# Patient Record
Sex: MALE | Race: WHITE | NOT HISPANIC OR LATINO | Employment: OTHER | URBAN - METROPOLITAN AREA
[De-identification: names, ages, dates, MRNs, and addresses within clinical notes are randomized per-mention and may not be internally consistent; named-entity substitution may affect disease eponyms.]

---

## 2021-05-31 ENCOUNTER — APPOINTMENT (EMERGENCY)
Dept: RADIOLOGY | Facility: HOSPITAL | Age: 71
DRG: 494 | End: 2021-05-31
Payer: MEDICARE

## 2021-05-31 ENCOUNTER — HOSPITAL ENCOUNTER (INPATIENT)
Facility: HOSPITAL | Age: 71
LOS: 3 days | Discharge: HOME HEALTH | DRG: 494 | End: 2021-06-03
Attending: ORTHOPAEDIC SURGERY | Admitting: SURGERY
Payer: MEDICARE

## 2021-05-31 ENCOUNTER — ANESTHESIA EVENT (INPATIENT)
Dept: PERIOP | Facility: HOSPITAL | Age: 71
DRG: 494 | End: 2021-05-31
Payer: MEDICARE

## 2021-05-31 ENCOUNTER — APPOINTMENT (INPATIENT)
Dept: RADIOLOGY | Facility: HOSPITAL | Age: 71
DRG: 494 | End: 2021-05-31
Payer: MEDICARE

## 2021-05-31 DIAGNOSIS — S82.251A CLOSED DISPLACED COMMINUTED FRACTURE OF SHAFT OF RIGHT TIBIA, INITIAL ENCOUNTER: ICD-10-CM

## 2021-05-31 DIAGNOSIS — S82.891A CLOSED FRACTURE OF RIGHT ANKLE, INITIAL ENCOUNTER: Primary | ICD-10-CM

## 2021-05-31 DIAGNOSIS — S92.001A CLOSED DISPLACED FRACTURE OF RIGHT CALCANEUS, UNSPECIFIED PORTION OF CALCANEUS, INITIAL ENCOUNTER: ICD-10-CM

## 2021-05-31 PROBLEM — K21.9 GASTROESOPHAGEAL REFLUX DISEASE: Status: ACTIVE | Noted: 2021-05-31

## 2021-05-31 LAB
ANION GAP SERPL CALCULATED.3IONS-SCNC: 7 MMOL/L (ref 4–13)
APTT PPP: 24 SECONDS (ref 23–37)
ATRIAL RATE: 60 BPM
BASE EXCESS BLDA CALC-SCNC: 0 MMOL/L (ref -2–3)
BUN SERPL-MCNC: 20 MG/DL (ref 5–25)
CALCIUM SERPL-MCNC: 9.1 MG/DL (ref 8.3–10.1)
CHLORIDE SERPL-SCNC: 109 MMOL/L (ref 100–108)
CO2 SERPL-SCNC: 23 MMOL/L (ref 21–32)
CREAT SERPL-MCNC: 0.94 MG/DL (ref 0.6–1.3)
ERYTHROCYTE [DISTWIDTH] IN BLOOD BY AUTOMATED COUNT: 13.2 % (ref 11.6–15.1)
GFR SERPL CREATININE-BSD FRML MDRD: 82 ML/MIN/1.73SQ M
GLUCOSE SERPL-MCNC: 149 MG/DL (ref 65–140)
GLUCOSE SERPL-MCNC: 152 MG/DL (ref 65–140)
HCO3 BLDA-SCNC: 22.9 MMOL/L (ref 24–30)
HCT VFR BLD AUTO: 44.8 % (ref 36.5–49.3)
HCT VFR BLD CALC: 45 % (ref 36.5–49.3)
HGB BLD-MCNC: 15.1 G/DL (ref 12–17)
HGB BLDA-MCNC: 15.3 G/DL (ref 12–17)
HOLD SPECIMEN: NORMAL
HOLD SPECIMEN: YES
INR PPP: 0.98 (ref 0.84–1.19)
MCH RBC QN AUTO: 31.1 PG (ref 26.8–34.3)
MCHC RBC AUTO-ENTMCNC: 33.7 G/DL (ref 31.4–37.4)
MCV RBC AUTO: 92 FL (ref 82–98)
P AXIS: 78 DEGREES
PCO2 BLD: 24 MMOL/L (ref 21–32)
PCO2 BLD: 33.3 MM HG (ref 42–50)
PH BLD: 7.45 [PH] (ref 7.3–7.4)
PLATELET # BLD AUTO: 237 THOUSANDS/UL (ref 149–390)
PMV BLD AUTO: 9.6 FL (ref 8.9–12.7)
PO2 BLD: 31 MM HG (ref 35–45)
POTASSIUM BLD-SCNC: 3.9 MMOL/L (ref 3.5–5.3)
POTASSIUM SERPL-SCNC: 3.9 MMOL/L (ref 3.5–5.3)
PR INTERVAL: 188 MS
PROTHROMBIN TIME: 13 SECONDS (ref 11.6–14.5)
QRS AXIS: 57 DEGREES
QRSD INTERVAL: 100 MS
QT INTERVAL: 410 MS
QTC INTERVAL: 410 MS
RBC # BLD AUTO: 4.85 MILLION/UL (ref 3.88–5.62)
SAO2 % BLD FROM PO2: 64 % (ref 60–85)
SODIUM BLD-SCNC: 140 MMOL/L (ref 136–145)
SODIUM SERPL-SCNC: 139 MMOL/L (ref 136–145)
SPECIMEN SOURCE: ABNORMAL
T WAVE AXIS: 30 DEGREES
VENTRICULAR RATE: 60 BPM
WBC # BLD AUTO: 7.29 THOUSAND/UL (ref 4.31–10.16)

## 2021-05-31 PROCEDURE — 82803 BLOOD GASES ANY COMBINATION: CPT

## 2021-05-31 PROCEDURE — 82947 ASSAY GLUCOSE BLOOD QUANT: CPT

## 2021-05-31 PROCEDURE — 80048 BASIC METABOLIC PNL TOTAL CA: CPT | Performed by: ORTHOPAEDIC SURGERY

## 2021-05-31 PROCEDURE — 85730 THROMBOPLASTIN TIME PARTIAL: CPT | Performed by: ORTHOPAEDIC SURGERY

## 2021-05-31 PROCEDURE — 73590 X-RAY EXAM OF LOWER LEG: CPT

## 2021-05-31 PROCEDURE — 99285 EMERGENCY DEPT VISIT HI MDM: CPT

## 2021-05-31 PROCEDURE — 73030 X-RAY EXAM OF SHOULDER: CPT

## 2021-05-31 PROCEDURE — 99221 1ST HOSP IP/OBS SF/LOW 40: CPT | Performed by: SURGERY

## 2021-05-31 PROCEDURE — 85610 PROTHROMBIN TIME: CPT | Performed by: ORTHOPAEDIC SURGERY

## 2021-05-31 PROCEDURE — 93010 ELECTROCARDIOGRAM REPORT: CPT | Performed by: INTERNAL MEDICINE

## 2021-05-31 PROCEDURE — 76705 ECHO EXAM OF ABDOMEN: CPT | Performed by: SURGERY

## 2021-05-31 PROCEDURE — 73080 X-RAY EXAM OF ELBOW: CPT

## 2021-05-31 PROCEDURE — 84132 ASSAY OF SERUM POTASSIUM: CPT

## 2021-05-31 PROCEDURE — 85027 COMPLETE CBC AUTOMATED: CPT | Performed by: ORTHOPAEDIC SURGERY

## 2021-05-31 PROCEDURE — 85014 HEMATOCRIT: CPT

## 2021-05-31 PROCEDURE — 74177 CT ABD & PELVIS W/CONTRAST: CPT

## 2021-05-31 PROCEDURE — 93005 ELECTROCARDIOGRAM TRACING: CPT

## 2021-05-31 PROCEDURE — 73700 CT LOWER EXTREMITY W/O DYE: CPT

## 2021-05-31 PROCEDURE — 93308 TTE F-UP OR LMTD: CPT | Performed by: SURGERY

## 2021-05-31 PROCEDURE — 70450 CT HEAD/BRAIN W/O DYE: CPT

## 2021-05-31 PROCEDURE — 72125 CT NECK SPINE W/O DYE: CPT

## 2021-05-31 PROCEDURE — 84295 ASSAY OF SERUM SODIUM: CPT

## 2021-05-31 PROCEDURE — 36415 COLL VENOUS BLD VENIPUNCTURE: CPT | Performed by: ORTHOPAEDIC SURGERY

## 2021-05-31 PROCEDURE — 90715 TDAP VACCINE 7 YRS/> IM: CPT | Performed by: NURSE PRACTITIONER

## 2021-05-31 PROCEDURE — 96374 THER/PROPH/DIAG INJ IV PUSH: CPT

## 2021-05-31 PROCEDURE — 73560 X-RAY EXAM OF KNEE 1 OR 2: CPT

## 2021-05-31 PROCEDURE — NC001 PR NO CHARGE: Performed by: EMERGENCY MEDICINE

## 2021-05-31 PROCEDURE — 73650 X-RAY EXAM OF HEEL: CPT

## 2021-05-31 PROCEDURE — 71260 CT THORAX DX C+: CPT

## 2021-05-31 RX ORDER — FENTANYL CITRATE 50 UG/ML
1 INJECTION, SOLUTION INTRAMUSCULAR; INTRAVENOUS ONCE
Status: COMPLETED | OUTPATIENT
Start: 2021-05-31 | End: 2021-05-31

## 2021-05-31 RX ORDER — OMEPRAZOLE 20 MG/1
20 CAPSULE, DELAYED RELEASE ORAL DAILY
COMMUNITY

## 2021-05-31 RX ORDER — HYDROMORPHONE HYDROCHLORIDE 2 MG/1
4 TABLET ORAL EVERY 4 HOURS PRN
Status: DISCONTINUED | OUTPATIENT
Start: 2021-05-31 | End: 2021-06-03 | Stop reason: HOSPADM

## 2021-05-31 RX ORDER — ACETAMINOPHEN 325 MG/1
975 TABLET ORAL EVERY 8 HOURS SCHEDULED
Status: DISCONTINUED | OUTPATIENT
Start: 2021-05-31 | End: 2021-06-03 | Stop reason: HOSPADM

## 2021-05-31 RX ORDER — FENTANYL CITRATE 50 UG/ML
50 INJECTION, SOLUTION INTRAMUSCULAR; INTRAVENOUS ONCE
Status: DISCONTINUED | OUTPATIENT
Start: 2021-05-31 | End: 2021-06-03 | Stop reason: HOSPADM

## 2021-05-31 RX ORDER — HYDROMORPHONE HCL/PF 1 MG/ML
0.5 SYRINGE (ML) INJECTION
Status: DISCONTINUED | OUTPATIENT
Start: 2021-05-31 | End: 2021-06-02

## 2021-05-31 RX ORDER — SENNOSIDES 8.6 MG
2 TABLET ORAL DAILY
Status: DISCONTINUED | OUTPATIENT
Start: 2021-05-31 | End: 2021-06-03 | Stop reason: HOSPADM

## 2021-05-31 RX ORDER — FENTANYL CITRATE 50 UG/ML
INJECTION, SOLUTION INTRAMUSCULAR; INTRAVENOUS CODE/TRAUMA/SEDATION MEDICATION
Status: COMPLETED | OUTPATIENT
Start: 2021-05-31 | End: 2021-05-31

## 2021-05-31 RX ORDER — METHOCARBAMOL 750 MG/1
750 TABLET, FILM COATED ORAL EVERY 6 HOURS SCHEDULED
Status: DISCONTINUED | OUTPATIENT
Start: 2021-05-31 | End: 2021-06-03 | Stop reason: HOSPADM

## 2021-05-31 RX ORDER — SODIUM CHLORIDE, SODIUM GLUCONATE, SODIUM ACETATE, POTASSIUM CHLORIDE, MAGNESIUM CHLORIDE, SODIUM PHOSPHATE, DIBASIC, AND POTASSIUM PHOSPHATE .53; .5; .37; .037; .03; .012; .00082 G/100ML; G/100ML; G/100ML; G/100ML; G/100ML; G/100ML; G/100ML
75 INJECTION, SOLUTION INTRAVENOUS CONTINUOUS
Status: DISCONTINUED | OUTPATIENT
Start: 2021-05-31 | End: 2021-06-01

## 2021-05-31 RX ORDER — HYDROMORPHONE HYDROCHLORIDE 2 MG/1
2 TABLET ORAL EVERY 4 HOURS PRN
Status: DISCONTINUED | OUTPATIENT
Start: 2021-05-31 | End: 2021-06-03 | Stop reason: HOSPADM

## 2021-05-31 RX ORDER — PANTOPRAZOLE SODIUM 40 MG/1
40 TABLET, DELAYED RELEASE ORAL
Status: DISCONTINUED | OUTPATIENT
Start: 2021-06-01 | End: 2021-06-03 | Stop reason: HOSPADM

## 2021-05-31 RX ORDER — SODIUM CHLORIDE, SODIUM LACTATE, POTASSIUM CHLORIDE, CALCIUM CHLORIDE 600; 310; 30; 20 MG/100ML; MG/100ML; MG/100ML; MG/100ML
75 INJECTION, SOLUTION INTRAVENOUS CONTINUOUS
Status: DISCONTINUED | OUTPATIENT
Start: 2021-06-01 | End: 2021-06-01

## 2021-05-31 RX ORDER — DOCUSATE SODIUM 100 MG/1
100 CAPSULE, LIQUID FILLED ORAL 2 TIMES DAILY
Status: DISCONTINUED | OUTPATIENT
Start: 2021-05-31 | End: 2021-06-03 | Stop reason: HOSPADM

## 2021-05-31 RX ADMIN — TETANUS TOXOID, REDUCED DIPHTHERIA TOXOID AND ACELLULAR PERTUSSIS VACCINE, ADSORBED 0.5 ML: 5; 2.5; 8; 8; 2.5 SUSPENSION INTRAMUSCULAR at 11:32

## 2021-05-31 RX ADMIN — METHOCARBAMOL TABLETS 750 MG: 750 TABLET, COATED ORAL at 11:33

## 2021-05-31 RX ADMIN — HYDROMORPHONE HYDROCHLORIDE 2 MG: 2 TABLET ORAL at 14:18

## 2021-05-31 RX ADMIN — SODIUM CHLORIDE, SODIUM GLUCONATE, SODIUM ACETATE, POTASSIUM CHLORIDE, MAGNESIUM CHLORIDE, SODIUM PHOSPHATE, DIBASIC, AND POTASSIUM PHOSPHATE 75 ML/HR: .53; .5; .37; .037; .03; .012; .00082 INJECTION, SOLUTION INTRAVENOUS at 11:38

## 2021-05-31 RX ADMIN — ACETAMINOPHEN 975 MG: 325 TABLET, FILM COATED ORAL at 14:18

## 2021-05-31 RX ADMIN — HYDROMORPHONE HYDROCHLORIDE 0.5 MG: 1 INJECTION, SOLUTION INTRAMUSCULAR; INTRAVENOUS; SUBCUTANEOUS at 21:11

## 2021-05-31 RX ADMIN — HYDROMORPHONE HYDROCHLORIDE 0.5 MG: 1 INJECTION, SOLUTION INTRAMUSCULAR; INTRAVENOUS; SUBCUTANEOUS at 16:10

## 2021-05-31 RX ADMIN — ACETAMINOPHEN 975 MG: 325 TABLET, FILM COATED ORAL at 21:10

## 2021-05-31 RX ADMIN — HYDROMORPHONE HYDROCHLORIDE 4 MG: 2 TABLET ORAL at 19:56

## 2021-05-31 RX ADMIN — IOHEXOL 100 ML: 350 INJECTION, SOLUTION INTRAVENOUS at 10:20

## 2021-05-31 RX ADMIN — SODIUM CHLORIDE, SODIUM LACTATE, POTASSIUM CHLORIDE, AND CALCIUM CHLORIDE 75 ML/HR: .6; .31; .03; .02 INJECTION, SOLUTION INTRAVENOUS at 23:06

## 2021-05-31 RX ADMIN — HYDROMORPHONE HYDROCHLORIDE 0.5 MG: 1 INJECTION, SOLUTION INTRAMUSCULAR; INTRAVENOUS; SUBCUTANEOUS at 11:33

## 2021-05-31 RX ADMIN — FENTANYL CITRATE 50 MCG: 50 INJECTION INTRAMUSCULAR; INTRAVENOUS at 10:46

## 2021-05-31 NOTE — PROCEDURES
POC FAST US    Date/Time: 5/31/2021 11:50 AM  Performed by: JAMES Bush  Authorized by:  JAMES Bush     Patient location:  Trauma  Procedure details:     Exam Type:  Diagnostic    Indications: blunt abdominal trauma      Assess for:  Hemothorax, intra-abdominal fluid, pericardial effusion and pneumothorax    Technique: FAST      Views obtained:  Heart - Pericardial sac, RUQ - Nunez's Pouch, LUQ - Splenorenal space and Suprapubic - Pouch of Ivan    Image quality: diagnostic      Image availability:  Images available in PACS  FAST Findings:     RUQ (Hepatorenal) free fluid: absent      LUQ (Splenorenal) free fluid: absent      Suprapubic free fluid: absent      Cardiac wall motion: identified      Pericardial effusion: absent    Interpretation:     Impressions: negative

## 2021-05-31 NOTE — ANESTHESIA PREPROCEDURE EVALUATION
Procedure:  OPEN REDUCTION W/ INTERNAL FIXATION (ORIF) TIBIA (Right Leg Lower)    Relevant Problems   ANESTHESIA (within normal limits)   (-) History of anesthesia complications      CARDIO (within normal limits)      ENDO (within normal limits)      GI/HEPATIC   (+) Gastroesophageal reflux disease      /RENAL (within normal limits)      HEMATOLOGY (within normal limits)      NEURO/PSYCH (within normal limits)      PULMONARY (within normal limits)        Physical Exam    Airway    Mallampati score: II  TM Distance: >3 FB  Neck ROM: full     Dental   No notable dental hx     Cardiovascular  Rhythm: regular, Rate: normal, Cardiovascular exam normal    Pulmonary  Pulmonary exam normal Breath sounds clear to auscultation,     Other Findings        Anesthesia Plan  ASA Score- 2     Anesthesia Type- general with ASA Monitors  Additional Monitors:   Airway Plan: LMA  Plan Factors-Exercise tolerance (METS): >4 METS  Chart reviewed  Imaging results reviewed  Existing labs reviewed  Patient summary reviewed  Patient is not a current smoker  Patient did not smoke on day of surgery  Induction- intravenous  Postoperative Plan-   Planned trial extubation    Informed Consent- Anesthetic plan and risks discussed with patient  I personally reviewed this patient with the CRNA  Discussed and agreed on the Anesthesia Plan with the CRNA         NPO verified  NKDA  Plan:  GA    Risks and benefits of general anesthesia were discussed with the patient  Discussed risks of anesthesia including, but not limited to, the risk of dental injury, n/v, sore throat, corneal abrasions, and arrhythmias  Less common risks including MI, stroke, and death were also discussed  All questions were answered  Anesthesia consent was obtained from the patient

## 2021-05-31 NOTE — INCIDENTAL FINDINGS
The following findings require follow up:  Radiographic   Finding: Small pulmonary nodules as indicated above  Based on current Fleischner Society 2017 Guidelines on incidental pulmonary nodule, no routine follow-up is needed if the patient is considered low risk for lung cancer  If the patient is considered high   risk for lung cancer, 12 month follow-up non-contrast chest CT is recommended     Follow up required: PCP   Follow up should be done within 2 week(s)    Please notify the following clinician to assist with the follow up:   PCP, if you do not have one, would recommend [address] [place] [address]

## 2021-05-31 NOTE — PROGRESS NOTES
met Penny Mendez' wife Igor Mclain in the ED and put her in family waiting room  She and patient were calm and reported no needs at time of visit

## 2021-05-31 NOTE — H&P
H&P Exam - Trauma   Keyana Robert 80 y o  male MRN: [id number]  Unit/Bed#: TR 02 Encounter: [id number]    Assessment/Plan   Trauma Alert: Level B  Model of Arrival: Ambulance  Trauma Team: Attending Anders Brandt, Residents Tereza and ALVIN Lewis  Consultants: Orthopedics 10:15 am    Trauma Active Problems: S/P Fall from approximately 10 feet off a roof  Right ankle pain  Multiple abrasions  Right calcaneus fracture    Trauma Plan: Admit to trauma  Ortho consulted  Pain control  NPO for OR  Assist   Ortho with splinting      Chief Complaint: right ankle pain    History of Present Illness   HPI:  Keyana Robert is a 80 y o  male who presents after a fall from 10 feet off a roof, No LOC but did strike head  Abrasions on rigth elbow, x-rays negative  Recalls the fall, pain in right lower extremity only  No complaints of chest pain or shorttness of breath  No c-spine, T-0spine, or L-spine tenderness  Relatively healthy individual     Mechanism:Fall    Review of Systems   Constitutional: Negative  HENT: Negative  Eyes: Negative  Respiratory: Negative  Cardiovascular: Negative  Gastrointestinal: Negative  Endocrine: Negative  Genitourinary: Negative  Musculoskeletal:        Right ankle pain   Skin: Positive for wound  Multiple abrasions   Allergic/Immunologic: Negative  Neurological: Negative  Hematological: Negative  Psychiatric/Behavioral: Negative  12-point, complete review of systems was reviewed and negative except as stated above  Historical Information   History is obtainable from the patient   Efforts to obtain history included the following sources: EMS    No past medical history on file  No past surgical history on file    Social History   Social History     Substance and Sexual Activity   Alcohol Use Not on file     Social History     Substance and Sexual Activity   Drug Use Not on file     Social History     Tobacco Use   Smoking Status Not on file E-Cigarette/Vaping    E-Cigarette Use Never User      E-Cigarette/Vaping Substances    Nicotine No     THC No     CBD No     Flavoring No     Other No     Unknown No        There is no immunization history on file for this patient  Last Tetanus: today  Family History: Non-contributory        Meds/Allergies   all current active meds have been reviewed    No Known Allergies      PHYSICAL EXAM      Objective   Vitals:   First set: Temperature: 98 1 °F (36 7 °C) (05/31/21 1003)  Pulse: 69 (05/31/21 1003)  Respirations: 20 (05/31/21 1003)  Blood Pressure: 166/89 (05/31/21 1003)    Primary Survey:   (A) Airway: intact  (B) Breathing: non-labored  (C) Circulation: Pulses:  normal  (D) Disabliity:  GCS Total:  15, Eye Opening:   Spontaneous = 4, Motor Response: Obeys commands = 6 and Verbal Response:  Oriented = 5  (E) Expose:  Completed    Secondary Survey: (Click on Physical Exam tab above)  Physical Exam  Constitutional:       General: He is not in acute distress  Appearance: Normal appearance  HENT:      Head: Normocephalic and atraumatic  Right Ear: Tympanic membrane normal       Left Ear: Tympanic membrane normal       Nose: Nose normal       Mouth/Throat:      Pharynx: Oropharynx is clear  Eyes:      Extraocular Movements: Extraocular movements intact  Conjunctiva/sclera: Conjunctivae normal       Pupils: Pupils are equal, round, and reactive to light  Neck:      Musculoskeletal: Normal range of motion and neck supple  Cardiovascular:      Rate and Rhythm: Normal rate and regular rhythm  Pulses: Normal pulses  Heart sounds: Normal heart sounds  Pulmonary:      Effort: Pulmonary effort is normal       Breath sounds: Normal breath sounds  Abdominal:      General: Abdomen is flat  Bowel sounds are normal  There is no distension  Palpations: Abdomen is soft  Tenderness: There is no abdominal tenderness     Genitourinary:     Comments: Perineum clear  Musculoskeletal:         General: Tenderness and signs of injury present  Skin:     General: Skin is warm and dry  Capillary Refill: Capillary refill takes less than 2 seconds  Neurological:      General: No focal deficit present  Mental Status: He is alert and oriented to person, place, and time  Motor: No weakness  Psychiatric:         Mood and Affect: Mood normal          Behavior: Behavior normal          Thought Content: Thought content normal          Judgment: Judgment normal          Invasive Devices     Peripheral Intravenous Line            Peripheral IV 05/31/21 Right Antecubital less than 1 day    Peripheral IV 05/31/21 Right Hand less than 1 day                Lab Results: ISTAT and labs on hold  Imaging/EKG Studies: HCT - neg, CT C-spine - neg, c-spine cleared clinically and collar removed  FROM and no pain  CT C/A/P -   No acute posttraumatic abnormality identified in the chest, abdomen, or pelvis  Small pulmonary nodules as indicated above  Based on current Fleischner Society 2017 Guidelines on incidental pulmonary nodule, no routine follow-up is needed if the patient is considered low risk for lung cancer  If the patient is considered high   risk for lung cancer, 12 month follow-up non-contrast chest CT is recommended  Other Studies: CXR -pending  CT RLE -1  Comminuted mildly displaced distal right tibial fracture as noted with intra-articular extension  2   Mildly displaced comminuted calcaneal fracture as noted       Code Status: No Order  Advance Directive and Living Will:      Power of :    POLST:

## 2021-05-31 NOTE — CONSULTS
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 and a right hand boxer's fracture in the past, but has never had surgery to his legs  He does not smoke, drink or use any drugs  He is retired and lives at home  Review Of Systems:   · Skin: Normal  · Neuro: See HPI  · Musculoskeletal: See HPI  · 14 point review of systems negative except as stated above     Past Medical History:   No past medical history on file  Past Surgical History:   No past surgical history on file  Family History:  Family history reviewed and non-contributory  No family history on file      Social History:  Social History     Socioeconomic History    Marital status: /Civil Union     Spouse name: Not on file    Number of children: Not on file    Years of education: Not on file    Highest education level: Not on file   Occupational History    Not on file   Social Needs    Financial resource strain: Not on file    Food insecurity     Worry: Not on file     Inability: Not on file   [employer] needs     Medical: Not on file     Non-medical: Not on file   Tobacco Use    Smoking status: Not on file   Substance and Sexual Activity    Alcohol use: Not on file    Drug use: Not on file    Sexual activity: Not on file   Lifestyle    Physical activity     Days per week: Not on file     Minutes per session: Not on file    Stress: Not on file   Relationships    Social connections     Talks on phone: Not on file     Gets together: Not on file     Attends Jew service: Not on file     Active member of club or organization: Not on file     Attends meetings of clubs or organizations: Not on file     Relationship status: Not on file    Intimate partner violence     Fear of current or ex partner: Not on file     Emotionally abused: Not on file     Physically abused: Not on file     Forced sexual activity: Not on file   Other Topics Concern    Not on file   Social History Narrative    Not on file       Allergies:   No Known Allergies        Labs:  0   Lab Value Date/Time    HCT 45 05/31/2021 1010    HGB 15 3 05/31/2021 1010       Meds:  No current facility-administered medications for this encounter  No current outpatient medications on file  Blood Culture:   No results found for: BLOODCX    Wound Culture:   No results found for: WOUNDCULT    Ins and Outs:  No intake/output data recorded  Physical Exam:   BP (!) 171/92   Pulse 62   Temp 98 1 °F (36 7 °C)   Resp 20   Wt 98 7 kg (217 lb 9 5 oz)   SpO2 98%   Gen: Alert and oriented to person, place, time  HEENT: EOMI, eyes clear, moist mucus membranes, hearing intact  Respiratory: Bilateral chest rise  No audible wheezing found  Cardiovascular: Regular Rate and Rhythm  Abdomen: soft nontender/nondistended  Musculoskeletal: right lower extremity  · Skin intact  · Tender to palpation over tibial shaft and over calcaneus  · Sensation intact dp/sp/tib/silvia/saph  · Intact ankle DF/PF, fhl/ehl  · Musculature of lower leg soft and compressible   · No pain with passive stretch  · Palpable DP pulse    Radiology:   I personally reviewed the films    X-rays right tib/fib shows a distal third tibial shaft fracture with proximal comminution  X-ray right calcaneus: comminuted displaced calcaneus fracture  CT right tibia/ankle: comminuted distal third tibia fracture with intra-articular extension and a comminuted calcaneus fracture     _*_*_*_*_*_*_*_*_*_*_*_*_*_*_*_*_*_*_*_*_*_*_*_*_*_*_*_*_*_*_*_*_*_*_*_*_*_*_*_*_*    Assessment:  80 y o  male status post fall from a roof with right tibial shaft fracture and calcaneus fracture  Placed in a bulky pulliam  long leg splint with stirrups  Plan:   · Non weight bearing right lower extremity in splint  · Analgesics for pain  · Informed consent obtained    · Pre op labs in ED  · NPO   · To OR for operative fixation of tibial shaft fracture when cleared  · Dispo: Ortho will follow      Linda Pendleton MD

## 2021-05-31 NOTE — ED PROVIDER NOTES
Emergency Department Airway Evaluation and Management Form    History  Obtained from:  EMS  Patient has no known allergies  Chief Complaint   Patient presents with    Fall     fall over 10 ft roof  no thinners, no LOC  head abrasion  RLE deformity      HPI patient is a 64-year-old male who was working on the roof when he fell  Patient landed on his feet, primarily his right foot, and fell backwards striking his head  No loss of consciousness  Patient complains of 3 to 4/10 right ankle and foot pain  Also has some aching in his right shoulder  No past medical history on file  No past surgical history on file  No family history on file  Social History     Tobacco Use    Smoking status: Not on file   Substance Use Topics    Alcohol use: Not on file    Drug use: Not on file     I have reviewed and agree with the history as documented      Review of Systems    Physical Exam  BP (!) 171/92   Pulse 62   Temp 98 1 °F (36 7 °C)   Resp 20   Wt 98 7 kg (217 lb 9 5 oz)   SpO2 98%     Physical Exam  No pooling secretions, lungs are clear and equal, no chest wall tenderness, adequate oxygen saturation with normal work of breathing, deformity and tenderness to right lower extremity  ED Medications  Medications   fentanyl citrate (PF) (FOR EMS ONLY) 100 mcg/2 mL injection 100 mcg (0 mcg Does not apply Given to EMS 5/31/21 1008)       Intubation  Procedures    Notes      Final Diagnosis  Final diagnoses:   None     Impression:  Tibial fracture, stable airway  ED Provider  Electronically Signed by     Jacob Culver MD  05/31/21 1013

## 2021-05-31 NOTE — PLAN OF CARE
Problem: PAIN - ADULT  Goal: Verbalizes/displays adequate comfort level or baseline comfort level  Description: Interventions:  - Encourage patient to monitor pain and request assistance  - Assess pain using appropriate pain scale  - Administer analgesics based on type and severity of pain and evaluate response  - Implement non-pharmacological measures as appropriate and evaluate response  - Consider cultural and social influences on pain and pain management  - Notify physician/advanced practitioner if interventions unsuccessful or patient reports new pain  Outcome: Progressing     Problem: INFECTION - ADULT  Goal: Absence or prevention of progression during hospitalization  Description: INTERVENTIONS:  - Assess and monitor for signs and symptoms of infection  - Monitor lab/diagnostic results  - Monitor all insertion sites, i e  indwelling lines, tubes, and drains  - Monitor endotracheal if appropriate and nasal secretions for changes in amount and color  - Palm appropriate cooling/warming therapies per order  - Administer medications as ordered  - Instruct and encourage patient and family to use good hand hygiene technique  - Identify and instruct in appropriate isolation precautions for identified infection/condition  Outcome: Progressing  Goal: Absence of fever/infection during neutropenic period  Description: INTERVENTIONS:  - Monitor WBC    Outcome: Progressing     Problem: SAFETY ADULT  Goal: Patient will remain free of falls  Description: INTERVENTIONS:  - Assess patient frequently for physical needs  -  Identify cognitive and physical deficits and behaviors that affect risk of falls    -  Palm fall precautions as indicated by assessment   - Educate patient/family on patient safety including physical limitations  - Instruct patient to call for assistance with activity based on assessment  - Modify environment to reduce risk of injury  - Consider OT/PT consult to assist with strengthening/mobility  Outcome: Progressing  Goal: Maintain or return to baseline ADL function  Description: INTERVENTIONS:  -  Assess patient's ability to carry out ADLs; assess patient's baseline for ADL function and identify physical deficits which impact ability to perform ADLs (bathing, care of mouth/teeth, toileting, grooming, dressing, etc )  - Assess/evaluate cause of self-care deficits   - Assess range of motion  - Assess patient's mobility; develop plan if impaired  - Assess patient's need for assistive devices and provide as appropriate  - Encourage maximum independence but intervene and supervise when necessary  - Involve family in performance of ADLs  - Assess for home care needs following discharge   - Consider OT consult to assist with ADL evaluation and planning for discharge  - Provide patient education as appropriate  Outcome: Progressing  Goal: Maintain or return mobility status to optimal level  Description: INTERVENTIONS:  - Assess patient's baseline mobility status (ambulation, transfers, stairs, etc )    - Identify cognitive and physical deficits and behaviors that affect mobility  - Identify mobility aids required to assist with transfers and/or ambulation (gait belt, sit-to-stand, lift, walker, cane, etc )  - [place] fall precautions as indicated by assessment  - Record patient progress and toleration of activity level on Mobility SBAR; progress patient to next Phase/Stage  - Instruct patient to call for assistance with activity based on assessment  - Consider rehabilitation consult to assist with strengthening/weightbearing, etc   Outcome: Progressing     Problem: DISCHARGE PLANNING  Goal: Discharge to home or other facility with appropriate resources  Description: INTERVENTIONS:  - Identify barriers to discharge w/patient and caregiver  - Arrange for needed discharge resources and transportation as appropriate  - Identify discharge learning needs (meds, wound care, etc )  - Arrange for interpretive services to assist at discharge as needed  - Refer to Case Management Department for coordinating discharge planning if the patient needs post-hospital services based on physician/advanced practitioner order or complex needs related to functional status, cognitive ability, or social support system  Outcome: Progressing     Problem: Knowledge Deficit  Goal: Patient/family/caregiver demonstrates understanding of disease process, treatment plan, medications, and discharge instructions  Description: Complete learning assessment and assess knowledge base    Interventions:  - Provide teaching at level of understanding  - Provide teaching via preferred learning methods  Outcome: Progressing

## 2021-06-01 ENCOUNTER — ANESTHESIA (INPATIENT)
Dept: PERIOP | Facility: HOSPITAL | Age: 71
DRG: 494 | End: 2021-06-01
Payer: MEDICARE

## 2021-06-01 ENCOUNTER — APPOINTMENT (INPATIENT)
Dept: RADIOLOGY | Facility: HOSPITAL | Age: 71
DRG: 494 | End: 2021-06-01
Payer: MEDICARE

## 2021-06-01 PROBLEM — S82.401A TIBIA/FIBULA FRACTURE, RIGHT, CLOSED, INITIAL ENCOUNTER: Status: ACTIVE | Noted: 2021-06-01

## 2021-06-01 PROBLEM — S92.009A CLOSED FRACTURE OF CALCANEUS: Status: ACTIVE | Noted: 2021-06-01
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
## 2021-06-01 PROBLEM — M25.511 ACUTE PAIN OF RIGHT SHOULDER: Status: ACTIVE | Noted: 2021-06-01

## 2021-06-01 PROBLEM — S82.201A TIBIA/FIBULA FRACTURE, RIGHT, CLOSED, INITIAL ENCOUNTER: Status: ACTIVE | Noted: 2021-06-01

## 2021-06-01 PROBLEM — G89.11 ACUTE PAIN DUE TO TRAUMA: Status: ACTIVE | Noted: 2021-06-01

## 2021-06-01 LAB
ABO GROUP BLD: NORMAL
ABO GROUP BLD: NORMAL
ANION GAP SERPL CALCULATED.3IONS-SCNC: 6 MMOL/L (ref 4–13)
BLD GP AB SCN SERPL QL: NEGATIVE
BUN SERPL-MCNC: 14 MG/DL (ref 5–25)
CALCIUM SERPL-MCNC: 8.4 MG/DL (ref 8.3–10.1)
CHLORIDE SERPL-SCNC: 107 MMOL/L (ref 100–108)
CO2 SERPL-SCNC: 26 MMOL/L (ref 21–32)
CREAT SERPL-MCNC: 0.83 MG/DL (ref 0.6–1.3)
ERYTHROCYTE [DISTWIDTH] IN BLOOD BY AUTOMATED COUNT: 13.4 % (ref 11.6–15.1)
GFR SERPL CREATININE-BSD FRML MDRD: 89 ML/MIN/1.73SQ M
GLUCOSE SERPL-MCNC: 132 MG/DL (ref 65–140)
HCT VFR BLD AUTO: 39.4 % (ref 36.5–49.3)
HGB BLD-MCNC: 13.3 G/DL (ref 12–17)
MCH RBC QN AUTO: 31.5 PG (ref 26.8–34.3)
MCHC RBC AUTO-ENTMCNC: 33.8 G/DL (ref 31.4–37.4)
MCV RBC AUTO: 93 FL (ref 82–98)
PLATELET # BLD AUTO: 165 THOUSANDS/UL (ref 149–390)
PMV BLD AUTO: 9.3 FL (ref 8.9–12.7)
POTASSIUM SERPL-SCNC: 3.9 MMOL/L (ref 3.5–5.3)
RBC # BLD AUTO: 4.22 MILLION/UL (ref 3.88–5.62)
RH BLD: POSITIVE
RH BLD: POSITIVE
SODIUM SERPL-SCNC: 139 MMOL/L (ref 136–145)
SPECIMEN EXPIRATION DATE: NORMAL
WBC # BLD AUTO: 6.63 THOUSAND/UL (ref 4.31–10.16)

## 2021-06-01 PROCEDURE — 99232 SBSQ HOSP IP/OBS MODERATE 35: CPT | Performed by: SURGERY

## 2021-06-01 PROCEDURE — C1713 ANCHOR/SCREW BN/BN,TIS/BN: HCPCS | Performed by: ORTHOPAEDIC SURGERY

## 2021-06-01 PROCEDURE — 80048 BASIC METABOLIC PNL TOTAL CA: CPT | Performed by: NURSE PRACTITIONER

## 2021-06-01 PROCEDURE — 27758 TREATMENT OF TIBIA FRACTURE: CPT | Performed by: ORTHOPAEDIC SURGERY

## 2021-06-01 PROCEDURE — 86901 BLOOD TYPING SEROLOGIC RH(D): CPT | Performed by: ORTHOPAEDIC SURGERY

## 2021-06-01 PROCEDURE — 86900 BLOOD TYPING SEROLOGIC ABO: CPT | Performed by: ORTHOPAEDIC SURGERY

## 2021-06-01 PROCEDURE — NC001 PR NO CHARGE: Performed by: ORTHOPAEDIC SURGERY

## 2021-06-01 PROCEDURE — 85027 COMPLETE CBC AUTOMATED: CPT | Performed by: NURSE PRACTITIONER

## 2021-06-01 PROCEDURE — 0QSG04Z REPOSITION RIGHT TIBIA WITH INTERNAL FIXATION DEVICE, OPEN APPROACH: ICD-10-PCS | Performed by: ORTHOPAEDIC SURGERY

## 2021-06-01 PROCEDURE — G9197 ORDER FOR CEPH: HCPCS | Performed by: ORTHOPAEDIC SURGERY

## 2021-06-01 PROCEDURE — 99223 1ST HOSP IP/OBS HIGH 75: CPT | Performed by: ORTHOPAEDIC SURGERY

## 2021-06-01 PROCEDURE — 73590 X-RAY EXAM OF LOWER LEG: CPT

## 2021-06-01 PROCEDURE — 86850 RBC ANTIBODY SCREEN: CPT | Performed by: ORTHOPAEDIC SURGERY

## 2021-06-01 DEVICE — 3.5MM CORTEX SCREW SELF-TAPPING 30MM: Type: IMPLANTABLE DEVICE | Status: FUNCTIONAL

## 2021-06-01 DEVICE — 3.5MM LOCKING SCREW SLF-TPNG W/STARDRIVE(TM) RECESS 45MM: Type: IMPLANTABLE DEVICE | Status: FUNCTIONAL

## 2021-06-01 DEVICE — 3.5MM LOCKING SCREW SLF-TPNG W/STARDRIVE(TM) RECESS 28MM: Type: IMPLANTABLE DEVICE | Status: FUNCTIONAL

## 2021-06-01 DEVICE — 3.5MM CORTEX SCREW SELF-TAPPING 28MM: Type: IMPLANTABLE DEVICE | Status: FUNCTIONAL

## 2021-06-01 DEVICE — 3.5MM CORTEX SCREW SELF-TAPPING 32MM: Type: IMPLANTABLE DEVICE | Status: FUNCTIONAL

## 2021-06-01 DEVICE — 3.5MM LCP LOW BEND MEDIAL DSTL TIBIA PLATE/14H/RIGHT/239MM
Type: IMPLANTABLE DEVICE | Site: TIBIA | Status: FUNCTIONAL
Brand: LCP

## 2021-06-01 DEVICE — 3.5MM LOCKING SCREW SLF-TPNG W/STARDRIVE(TM) RECESS 40MM: Type: IMPLANTABLE DEVICE | Status: FUNCTIONAL

## 2021-06-01 RX ORDER — KETAMINE HCL IN NACL, ISO-OSM 100MG/10ML
SYRINGE (ML) INJECTION AS NEEDED
Status: DISCONTINUED | OUTPATIENT
Start: 2021-06-01 | End: 2021-06-01

## 2021-06-01 RX ORDER — SODIUM CHLORIDE, SODIUM LACTATE, POTASSIUM CHLORIDE, CALCIUM CHLORIDE 600; 310; 30; 20 MG/100ML; MG/100ML; MG/100ML; MG/100ML
75 INJECTION, SOLUTION INTRAVENOUS CONTINUOUS
Status: DISCONTINUED | OUTPATIENT
Start: 2021-06-01 | End: 2021-06-01

## 2021-06-01 RX ORDER — CEFAZOLIN SODIUM 2 G/50ML
2000 SOLUTION INTRAVENOUS EVERY 8 HOURS
Status: COMPLETED | OUTPATIENT
Start: 2021-06-01 | End: 2021-06-02

## 2021-06-01 RX ORDER — PROPOFOL 10 MG/ML
INJECTION, EMULSION INTRAVENOUS AS NEEDED
Status: DISCONTINUED | OUTPATIENT
Start: 2021-06-01 | End: 2021-06-01

## 2021-06-01 RX ORDER — SODIUM CHLORIDE, SODIUM LACTATE, POTASSIUM CHLORIDE, CALCIUM CHLORIDE 600; 310; 30; 20 MG/100ML; MG/100ML; MG/100ML; MG/100ML
75 INJECTION, SOLUTION INTRAVENOUS CONTINUOUS
Status: DISCONTINUED | OUTPATIENT
Start: 2021-06-01 | End: 2021-06-02

## 2021-06-01 RX ORDER — DEXAMETHASONE SODIUM PHOSPHATE 10 MG/ML
INJECTION, SOLUTION INTRAMUSCULAR; INTRAVENOUS AS NEEDED
Status: DISCONTINUED | OUTPATIENT
Start: 2021-06-01 | End: 2021-06-01

## 2021-06-01 RX ORDER — HYDROMORPHONE HCL 110MG/55ML
PATIENT CONTROLLED ANALGESIA SYRINGE INTRAVENOUS AS NEEDED
Status: DISCONTINUED | OUTPATIENT
Start: 2021-06-01 | End: 2021-06-01

## 2021-06-01 RX ORDER — MAGNESIUM HYDROXIDE 1200 MG/15ML
LIQUID ORAL AS NEEDED
Status: DISCONTINUED | OUTPATIENT
Start: 2021-06-01 | End: 2021-06-01 | Stop reason: HOSPADM

## 2021-06-01 RX ORDER — CEFAZOLIN SODIUM 2 G/50ML
2000 SOLUTION INTRAVENOUS
Status: ACTIVE | OUTPATIENT
Start: 2021-06-01 | End: 2021-06-02

## 2021-06-01 RX ORDER — HYDROMORPHONE HCL/PF 1 MG/ML
0.25 SYRINGE (ML) INJECTION
Status: DISCONTINUED | OUTPATIENT
Start: 2021-06-01 | End: 2021-06-01 | Stop reason: HOSPADM

## 2021-06-01 RX ORDER — ONDANSETRON 2 MG/ML
4 INJECTION INTRAMUSCULAR; INTRAVENOUS ONCE AS NEEDED
Status: DISCONTINUED | OUTPATIENT
Start: 2021-06-01 | End: 2021-06-01 | Stop reason: HOSPADM

## 2021-06-01 RX ORDER — OXYCODONE HYDROCHLORIDE 5 MG/1
5 TABLET ORAL EVERY 6 HOURS PRN
Qty: 30 TABLET | Refills: 0 | Status: SHIPPED | OUTPATIENT
Start: 2021-06-01 | End: 2021-06-17 | Stop reason: SDUPTHER

## 2021-06-01 RX ORDER — LIDOCAINE HYDROCHLORIDE 10 MG/ML
INJECTION, SOLUTION EPIDURAL; INFILTRATION; INTRACAUDAL; PERINEURAL AS NEEDED
Status: DISCONTINUED | OUTPATIENT
Start: 2021-06-01 | End: 2021-06-01

## 2021-06-01 RX ORDER — FENTANYL CITRATE/PF 50 MCG/ML
25 SYRINGE (ML) INJECTION
Status: DISCONTINUED | OUTPATIENT
Start: 2021-06-01 | End: 2021-06-01 | Stop reason: HOSPADM

## 2021-06-01 RX ORDER — MAGNESIUM SULFATE HEPTAHYDRATE 40 MG/ML
INJECTION, SOLUTION INTRAVENOUS AS NEEDED
Status: DISCONTINUED | OUTPATIENT
Start: 2021-06-01 | End: 2021-06-01

## 2021-06-01 RX ORDER — ONDANSETRON 2 MG/ML
INJECTION INTRAMUSCULAR; INTRAVENOUS AS NEEDED
Status: DISCONTINUED | OUTPATIENT
Start: 2021-06-01 | End: 2021-06-01

## 2021-06-01 RX ORDER — FENTANYL CITRATE 50 UG/ML
INJECTION, SOLUTION INTRAMUSCULAR; INTRAVENOUS AS NEEDED
Status: DISCONTINUED | OUTPATIENT
Start: 2021-06-01 | End: 2021-06-01

## 2021-06-01 RX ORDER — CEFAZOLIN SODIUM 2 G/50ML
2000 SOLUTION INTRAVENOUS
Status: COMPLETED | OUTPATIENT
Start: 2021-06-01 | End: 2021-06-01

## 2021-06-01 RX ADMIN — FENTANYL CITRATE 25 MCG: 50 INJECTION INTRAMUSCULAR; INTRAVENOUS at 08:05

## 2021-06-01 RX ADMIN — MAGNESIUM SULFATE HEPTAHYDRATE 2 G: 40 INJECTION, SOLUTION INTRAVENOUS at 08:10

## 2021-06-01 RX ADMIN — ENOXAPARIN SODIUM 30 MG: 30 INJECTION SUBCUTANEOUS at 14:27

## 2021-06-01 RX ADMIN — METHOCARBAMOL TABLETS 750 MG: 750 TABLET, COATED ORAL at 18:08

## 2021-06-01 RX ADMIN — CEFAZOLIN SODIUM 2000 MG: 2 SOLUTION INTRAVENOUS at 07:53

## 2021-06-01 RX ADMIN — FENTANYL CITRATE 25 MCG: 50 INJECTION INTRAMUSCULAR; INTRAVENOUS at 08:22

## 2021-06-01 RX ADMIN — FENTANYL CITRATE 50 MCG: 50 INJECTION INTRAMUSCULAR; INTRAVENOUS at 09:29

## 2021-06-01 RX ADMIN — METHOCARBAMOL TABLETS 750 MG: 750 TABLET, COATED ORAL at 11:28

## 2021-06-01 RX ADMIN — ONDANSETRON 4 MG: 2 INJECTION INTRAMUSCULAR; INTRAVENOUS at 09:17

## 2021-06-01 RX ADMIN — Medication 10 MG: at 08:06

## 2021-06-01 RX ADMIN — METHOCARBAMOL TABLETS 750 MG: 750 TABLET, COATED ORAL at 23:47

## 2021-06-01 RX ADMIN — PANTOPRAZOLE SODIUM 40 MG: 40 TABLET, DELAYED RELEASE ORAL at 05:27

## 2021-06-01 RX ADMIN — DOCUSATE SODIUM 100 MG: 100 CAPSULE ORAL at 18:09

## 2021-06-01 RX ADMIN — HYDROMORPHONE HYDROCHLORIDE 0.25 MG: 1 INJECTION, SOLUTION INTRAMUSCULAR; INTRAVENOUS; SUBCUTANEOUS at 10:15

## 2021-06-01 RX ADMIN — Medication 25 MCG: at 10:01

## 2021-06-01 RX ADMIN — HYDROMORPHONE HYDROCHLORIDE 0.25 MG: 1 INJECTION, SOLUTION INTRAMUSCULAR; INTRAVENOUS; SUBCUTANEOUS at 10:25

## 2021-06-01 RX ADMIN — PROPOFOL 150 MG: 10 INJECTION, EMULSION INTRAVENOUS at 07:56

## 2021-06-01 RX ADMIN — Medication 10 MG: at 08:22

## 2021-06-01 RX ADMIN — Medication 25 MCG: at 09:56

## 2021-06-01 RX ADMIN — HYDROMORPHONE HYDROCHLORIDE 0.5 MG: 2 INJECTION, SOLUTION INTRAMUSCULAR; INTRAVENOUS; SUBCUTANEOUS at 08:26

## 2021-06-01 RX ADMIN — HYDROMORPHONE HYDROCHLORIDE 4 MG: 2 TABLET ORAL at 11:28

## 2021-06-01 RX ADMIN — CEFAZOLIN SODIUM 2000 MG: 2 SOLUTION INTRAVENOUS at 18:09

## 2021-06-01 RX ADMIN — LIDOCAINE HYDROCHLORIDE 5 ML: 10 INJECTION, SOLUTION EPIDURAL; INFILTRATION; INTRACAUDAL; PERINEURAL at 07:56

## 2021-06-01 RX ADMIN — Medication 25 MCG: at 10:09

## 2021-06-01 RX ADMIN — SODIUM CHLORIDE, SODIUM LACTATE, POTASSIUM CHLORIDE, AND CALCIUM CHLORIDE: .6; .31; .03; .02 INJECTION, SOLUTION INTRAVENOUS at 09:48

## 2021-06-01 RX ADMIN — ENOXAPARIN SODIUM 30 MG: 30 INJECTION SUBCUTANEOUS at 22:05

## 2021-06-01 RX ADMIN — HYDROMORPHONE HYDROCHLORIDE 2 MG: 2 TABLET ORAL at 02:08

## 2021-06-01 RX ADMIN — SODIUM CHLORIDE, SODIUM LACTATE, POTASSIUM CHLORIDE, AND CALCIUM CHLORIDE 75 ML/HR: .6; .31; .03; .02 INJECTION, SOLUTION INTRAVENOUS at 22:08

## 2021-06-01 RX ADMIN — CEFAZOLIN SODIUM 2000 MG: 2 SOLUTION INTRAVENOUS at 23:38

## 2021-06-01 RX ADMIN — HYDROMORPHONE HYDROCHLORIDE 2 MG: 2 TABLET ORAL at 19:54

## 2021-06-01 RX ADMIN — DEXAMETHASONE SODIUM PHOSPHATE 5 MG: 10 INJECTION, SOLUTION INTRAMUSCULAR; INTRAVENOUS at 09:17

## 2021-06-01 NOTE — OP NOTE
OPERATIVE REPORT  PATIENT NAME: Blane Ham    :  1950  MRN: [id number]  Pt Location:  OR ROOM 18    SURGERY DATE: 2021    Surgeon(s) and Role:     * Maynor Roper MD - Primary     * Abhay Romero MD - [address] [place] [address] - Assisting    Preop Diagnosis:  Closed displaced comminuted fracture of shaft of right tibia, initial encounter [S82 251A]  Closed fracture of right ankle, initial encounter [S82 891A]    Post-Op Diagnosis Codes:     * Closed displaced comminuted fracture of shaft of right tibia, initial encounter [S82 251A]     * Closed fracture of right ankle, initial encounter [S82 891A]    Procedure(s) (LRB):  OPEN REDUCTION W/ INTERNAL FIXATION (ORIF) TIBIA (Right)    Specimen(s):  * No specimens in log *    Estimated Blood Loss:   Minimal    Drains:  * No LDAs found *    Anesthesia Type:   Choice    Operative Indications:  Closed displaced comminuted fracture of shaft of right tibia, initial encounter [S82 251A]  Closed fracture of right ankle, initial encounter [S82 891A]      Operative Findings:  Comminuted distal tibia fracture    Complications:   None    Procedure and Technique:    Open reduction internal fixation right distal tibia  Patient was correctly identified by both the anesthesia department and myself  The right lower extremity was marked  In the operating room general anesthesia was induced  Tourniquet was applied to the right thigh  The patient was positioned supine onto radiolucent flat top table ensuring that all bony prominences and neurovascular structures were adequately padded and protected  IV antibiotics were administered preoperatively  SCD was attached to the contralateral lower extremity  The right lower extremity was then prepped and draped in the usual sterile fashion  A time-out was performed  The right lower extremity was elevated and exsanguinated    AP and lateral imaging demonstrated with the incisions would be made overlying the anteromedial ankle, the site for the planned lag screw fixation and the top of where the plate would end  Under fluoroscopic guidance a stab incision was made overlying the planned site for the lag screw  Traction and reduction was performed ensuring the appropriate alignment  A lag screw was then placed from anterior to posterior under direct fluoroscopy achieving some compression at the fracture site  Attention was then paid to the anterior medial ankle  A curvilinear incision was made with the use of a 10 blade  The saphenous vein was protected  A Busby elevator was then passed from the anterior medial distal ankle up proximally past the fracture site  A Synthes 14 hole medial distal tibia plate was then passed in a minimally invasive technique from the anterior medial ankle past the fracture site  The plate was pre contoured prior to passing   AP and lateral imaging confirmed appropriate location of the plate  Distally the plate was secured in place with 4 locking screws which were drilled and measured under fluoroscopic guidance  Proximally the plate was secured in place with a combination of cortical nonlocking and locking screws which were drilled and measured under direct fluoroscopic guidance  Final AP and lateral imaging demonstrated appropriate reduction and fixation of a distal 3rd tibial fracture  The wounds were copiously irrigated with normal saline solution  Subcutaneous layers were closed 0 Vicryl suture  The skin layers proximally was closed with staples and distally were closed with 2 0 nylon suture in interrupted fashion  Sterile dressings were applied to the wound  Bulky Alonso was applied to the right lower extremity due to an ipsilateral calcaneus fracture to be treated in a delayed fashion  Anesthesia was reversed  Patient was taken off the OR table and taken to recovery room in stable condition           I was present for the entire procedure    Patient Disposition:  PACU Implants: Synthes 14 hole Medial Distal Tibia Plate    SIGNATURE: Melani Steve MD  DATE: June 1, 2021  TIME: 9:40 AM

## 2021-06-01 NOTE — ANESTHESIA POSTPROCEDURE EVALUATION
Post-Op Assessment Note    CV Status:  Stable  Pain Score: 0    Pain management: adequate     Mental Status:  Alert and awake   Hydration Status:  Euvolemic   PONV Controlled:  Controlled   Airway Patency:  Patent      Post Op Vitals Reviewed: Yes      Staff: CRNA, Anesthesiologist         No complications documented      /73   Temp 98 4   Pulse 79   Resp 16   SpO2 99% on simple face mask

## 2021-06-01 NOTE — NURSING NOTE
Patient awakened from sleep with low back pain and left sided rib pain 10/10  Patient currently has no prn pain medications ordered  Discussed with Dr Radha Landeros, trauma resident  Awaiting orders

## 2021-06-01 NOTE — NURSING NOTE
Patient yelling out and crying in pain  Pt  states pain is in lower back and left side of ribs, 10/10  Pt  Not yet due for hydromorphone or oxycodone as ordered  Of note, Pt  Does take chronic methadone 80mg liquid by mouth daily  Discussed with Dr Marilu Duval, trauma resident  Awaiting further orders

## 2021-06-01 NOTE — PROGRESS NOTES
Subjective: No acute events overnight  No acute distress  Resting in bed    Objective:  A 10 point ROS was performed; negative except as noted above  Lab Results   Component Value Date/Time    WBC 7 29 05/31/2021 10:08 AM    HGB 15 3 05/31/2021 10:10 AM    HGB 15 1 05/31/2021 10:08 AM       Vitals:    05/31/21 2225   BP: 138/79   Pulse: 71   Resp:    Temp: 98 5 °F (36 9 °C)   SpO2: 93%     Right lower extremity  Dressing C/D/I  Lower leg soft and compressible  Moving toes spontaneously  Sensation intact to distal aspect of toes visible in splint  Toes warm and well perfused with brisk capillary refill    Assessment: 80 y o  male with right distal 1/3 tibial shaft fracture and right calcaneous fracture    Plan:  NWB RLE in splint  To OR for Ex-Fix versus ORIF RLE  Pain control  DVT ppx:  On hold for OR  NPO  PT/OT  Dispo: Ortho will follow

## 2021-06-01 NOTE — PROGRESS NOTES
[address] [facility]  Progress Note - Juanita Apurva 1950, 80 y o  male MRN: [id number]  Unit/Bed#: [facility] 813-01 Encounter: [id number]  Primary Care Provider: No primary care provider on file  Date and time admitted to hospital: 5/31/2021 10:06 AM    Fall from height of greater than 3 feet  Assessment & Plan  - Fall from approximately 10 ft off of a roof with the below noted injuries  - Fall precautions   - PT and OT evaluation and treatment as indicated postoperatively  - Case Management following for disposition planning  * Tibia/fibula fracture, right, closed, initial encounter  Assessment & Plan  - Comminuted mildly displaced right distal tibial fracture with intra-articular extension, present on admission   - Patient is status post ORIF of the right distal tibial fracture on 06/01/2021  - Appreciate Orthopedic surgery evaluation, recommendations and intervention as noted  - Maintain non-weightbearing status on the right lower extremity in bulky Alonso dressing   - Monitor right lower extremity neurovascular exam   - Continue multimodal analgesic regimen   - Continue DVT prophylaxis  - PT and OT evaluation and treatment as indicated  - Outpatient follow up with Orthopedic surgery for re-evaluation  Closed fracture of calcaneus  Assessment & Plan  - Mildly displaced comminuted right calcaneal fracture, present on admission   - Appreciate Orthopedic surgery evaluation and recommendations  Anticipate delayed operative intervention, likely on an elective basis secondary to swelling   - Maintain non-weightbearing status on the right lower extremity in bulky Alonso dressing   - Monitor right lower extremity neurovascular exam   - Continue multimodal analgesic regimen   - Continue DVT prophylaxis  - PT and OT evaluation and treatment as indicated  - Outpatient follow up with Orthopedic surgery for re-evaluation        Acute pain of right shoulder  Assessment & Plan  - Acute right shoulder pain following fall from height   - No evidence of acute osseous injury on x-ray  - Likely secondary to contusion   - Activity as tolerated with weight-bearing as tolerated on the right upper extremity  - Continue multimodal analgesic regimen   - PT and OT evaluation and treatment as indicated  Acute pain due to trauma  Assessment & Plan  - Acute pain secondary to multiple traumatic injuries  - Continue multimodal analgesic regimen and adjust as indicated  - Bowel regimen while on opioid therapy  TERTIARY TRAUMA SURVEY NOTE    Prophylaxis: Sequential compression device (Venodyne)  and Enoxaparin (Lovenox)    Disposition:  Awaiting PT and OT evaluation and recommendations  Anticipate likely discharge home in the next 24-48 hours  Case Management following for disposition planning  Code status:  Level 1 - Full Code    Consultants:  Orthopedic surgery  Is the patient 72 years or older?: YES:    1  Before the illness or injury that brought you to the Emergency, did you need someone to help you on a regular basis? 0=No   2  Since the illness or injury that brought you to the Emergency, have you needed more help than usual to take care of yourself? 1=Yes   3  Have you been hospitalized for one or more nights during the past 6 months (excluding a stay in the Emergency Department)? 1=Yes   4  In general, do you see well? 0=Yes   5  In general, do you have serious problems with your memory? 0=No   6  Do you take more than three different medications everyday? 0=No   TOTAL   1     Did you order a geriatric consult if the score was 2 or greater?: n/a          SUBJECTIVE:     Transfer from: N/A  Outside Films Received: not applicable  Tertiary Exam Due on: 6/1/2021    Mechanism of Injury:Fall    Details related to Injury: +LOC:  no    Chief Complaint:  My right leg is hurting      HPI/Last 24 hour events:  Patient is doing okay overall postoperatively    Notes continued pain in his right leg, but admits that his pain medication regimen is working  He does note some pain in his right shoulder  He offers no other complaints at this time  He denies any shortness of breath or difficulty breathing  He denied any chest, neck, back or abdominal pain      Active medications:           Current Facility-Administered Medications:     acetaminophen (TYLENOL) tablet 975 mg, 975 mg, Oral, Q8H [address], 975 mg at 05/31/21 2110    ceFAZolin (ANCEF) IVPB (premix in dextrose) 2,000 mg 50 mL, 2,000 mg, Intravenous, On Call To OR    ceFAZolin (ANCEF) IVPB (premix in dextrose) 2,000 mg 50 mL, 2,000 mg, Intravenous, Q8H, 2,000 mg at 06/01/21 1809    docusate sodium (COLACE) capsule 100 mg, 100 mg, Oral, BID, 100 mg at 06/01/21 1809    enoxaparin (LOVENOX) subcutaneous injection 30 mg, 30 mg, Subcutaneous, Q12H [address]    fentanyl citrate (PF) 100 MCG/2ML 50 mcg, 50 mcg, Intravenous, Once    HYDROmorphone (DILAUDID) injection 0 5 mg, 0 5 mg, Intravenous, Q3H PRN, 0 5 mg at 05/31/21 2111    HYDROmorphone (DILAUDID) tablet 2 mg, 2 mg, Oral, Q4H PRN, 2 mg at 06/01/21 1954    HYDROmorphone (DILAUDID) tablet 4 mg, 4 mg, Oral, Q4H PRN, 4 mg at 06/01/21 1128    lactated ringers bolus 500 mL, 500 mL, Intravenous, Once PRN **AND** lactated ringers bolus 500 mL, 500 mL, Intravenous, Once PRN    lactated ringers infusion, 75 mL/hr, Intravenous, Continuous, 75 mL/hr at 06/01/21 1405    methocarbamol (ROBAXIN) tablet 750 mg, 750 mg, Oral, Q6H MANOJ, 750 mg at 06/01/21 1808    pantoprazole (PROTONIX) EC tablet 40 mg, 40 mg, Oral, Early Morning, 40 mg at 06/01/21 0527    senna (SENOKOT) tablet 17 2 mg, 2 tablet, Oral, Daily    sodium chloride 0 9 % bolus 500 mL, 500 mL, Intravenous, Once PRN **AND** sodium chloride 0 9 % bolus 500 mL, 500 mL, Intravenous, Once PRN      OBJECTIVE:     Vitals:   Vitals:    06/1950   BP: 142/72   Pulse: 74   Resp: 18   Temp: 99 1 °F (37 3 °C)   SpO2: 96%       Physical Exam:   GENERAL APPEARANCE: Patient in no acute distress  HEENT: NCAT; EOMs intact; Mucous membranes moist  NECK / BACK:  No midline cervical, thoracic or lumbar spine tenderness, step-offs or deformities  No paraspinal muscular tenderness in the neck or back  CV: Regular rate and rhythm; no murmur/gallops/rubs appreciated  CHEST / LUNGS: Clear to auscultation; no wheezes/rales/rhonci  No chest wall tenderness, crepitus or deformities  ABD: NABS; soft; non-distended; non-tender  :  Voiding spontaneously  EXT: +2 pulses bilaterally upper & lower extremities; no edema  Normal range of motion in the left upper and left lower extremities without pain, tenderness or deformity  There was mild pain with range of motion of the right shoulder as well as some anterior right shoulder tenderness without deformity; normal range of motion throughout the remainder of the right upper extremity without pain, tenderness or deformity  The distal right lower extremity is noted to have a bulky Alonso dressing in place with soft muscle compartments and an intact neurovascular exam throughout the right lower extremity  There was moderate tenderness over the right distal lower leg and foot as well as swelling in the exposed distal foot/toes  NEURO: GCS 15; no focal neurologic deficits; neurovascularly intact  SKIN: Warm, dry and well perfused; no rash; no jaundice  I/O:   I/O       05/30 0701 - 05/31 0700 05/31 0701 - 06/01 0700 06/01 0701 - 06/02 0700    P  O   120     I V  (mL/kg)  856 3 (10 5) 1000 (12 3)    Total Intake(mL/kg)  976 3 (12) 1000 (12 3)    Urine (mL/kg/hr)  400     Emesis/NG output  200     Total Output  600     Net  +376 3 +1000                 Invasive Devices: Invasive Devices     Peripheral Intravenous Line            Peripheral IV 05/31/21 Right Antecubital 1 day                  Imaging:   Xr Shoulder 2+ Vw Right    Result Date: 5/31/2021  Impression: No acute osseous abnormality   Workstation performed: [id number]     XW Elbow 3+ Vw Right    Result Date: 5/31/2021  Impression: No acute osseous abnormality  Workstation performed: [id number]     Xr Knee 1 Or 2 Vw Right    Result Date: 5/31/2021  Impression: Limited study with probable proximal fibular fracture  Proximal tibial fracture not clearly identified but additional views suggested for more thorough evaluation  Workstation performed: [id number]     Xr Tibia Fibula 2 Vw Right    Result Date: 5/31/2021  Impression: Unchanged alignment of comminuted intra-articular distal tibial shaft fracture and comminuted calcaneal fracture  Workstation performed: [id number]     Xr Heel / Calcaneus 2+ Vw Right    Result Date: 5/31/2021  Impression: 1  Minimally displaced depressed comminuted calcaneal fracture  2   Distal tibial fracture  Workstation performed: [id number]     Trauma - Ct Head Wo Contrast    Result Date: 5/31/2021  Impression: No acute intracranial abnormality  I personally discussed this study with BEKAH Medeiros  on 5/31/2021 at 10:40 AM  Workstation performed: [id number]     Trauma - Ct Spine Cervical Wo Contrast    Result Date: 5/31/2021  Impression: No cervical spine fracture or traumatic malalignment  I personally discussed this study with BEKAH Medeiros  on 5/31/2021 at 10:40 AM   Workstation performed: [id number]     Trauma - Ct Chest Abdomen Pelvis W Contrast    Result Date: 5/31/2021  Impression: 1  No acute posttraumatic abnormality identified in the chest, abdomen, or pelvis  2   Small pulmonary nodules as indicated above  Based on current Fleischner Society 2017 Guidelines on incidental pulmonary nodule, no routine follow-up is needed if the patient is considered low risk for lung cancer  If the patient is considered high risk for lung cancer, 12 month follow-up non-contrast chest CT is recommended  3   Additional findings as noted above    I personally discussed this study with BEKAH Medeiros  on 5/31/2021 at 11:00 AM  Workstation performed: [id number]     Xr Trauma Multiple (slb/slra Trauma [place] Only)    Addendum Date: 5/31/2021    ADDENDUM: In addition to the comminuted tibial fracture, there is also comminuted calcaneal fracture  Result Date: 5/31/2021  Impression: No acute cardiopulmonary disease within limitations of supine imaging  Comminuted and mildly displaced distal tibial shaft fracture  Workstation performed: [id number]     Ct Lower Extremity Wo Contrast Right    Result Date: 5/31/2021  Impression: 1  Comminuted mildly displaced distal right tibial fracture as noted with intra-articular extension  2   Mildly displaced comminuted calcaneal fracture as noted   Workstation performed: [id number]       Labs:   CBC:   Lab Results   Component Value Date    WBC 6 63 06/01/2021    HGB 13 3 06/01/2021    HCT 39 4 06/01/2021    MCV 93 06/01/2021     06/01/2021    MCH 31 5 06/01/2021    MCHC 33 8 06/01/2021    RDW 13 4 06/01/2021    MPV 9 3 06/01/2021     CMP:   Lab Results   Component Value Date     06/01/2021    CO2 26 06/01/2021    BUN 14 06/01/2021    CREATININE 0 83 06/01/2021    CALCIUM 8 4 06/01/2021    EGFR 89 06/01/2021         Joni Phillips PA-C  6/1/2021 12:46 PM

## 2021-06-01 NOTE — PLAN OF CARE
Problem: PAIN - ADULT  Goal: Verbalizes/displays adequate comfort level or baseline comfort level  Description: Interventions:  - Encourage patient to monitor pain and request assistance  - Assess pain using appropriate pain scale  - Administer analgesics based on type and severity of pain and evaluate response  - Implement non-pharmacological measures as appropriate and evaluate response  - Consider cultural and social influences on pain and pain management  - Notify physician/advanced practitioner if interventions unsuccessful or patient reports new pain  6/1/2021 0757 by Solange Staley RN  Outcome: Not Progressing  5/31/2021 1850 by Solange Staley RN  Outcome: Progressing     Problem: INFECTION - ADULT  Goal: Absence or prevention of progression during hospitalization  Description: INTERVENTIONS:  - Assess and monitor for signs and symptoms of infection  - Monitor lab/diagnostic results  - Monitor all insertion sites, i e  indwelling lines, tubes, and drains  - Monitor endotracheal if appropriate and nasal secretions for changes in amount and color  - [place] appropriate cooling/warming therapies per order  - Administer medications as ordered  - Instruct and encourage patient and family to use good hand hygiene technique  - Identify and instruct in appropriate isolation precautions for identified infection/condition  6/1/2021 0757 by Solange Staley RN  Outcome: Not Progressing  5/31/2021 1850 by Solange Staley RN  Outcome: Progressing  Goal: Absence of fever/infection during neutropenic period  Description: INTERVENTIONS:  - Monitor WBC    6/1/2021 0757 by Solange Staley RN  Outcome: Not Progressing  5/31/2021 1850 by Solange Staley RN  Outcome: Progressing     Problem: SAFETY ADULT  Goal: Patient will remain free of falls  Description: INTERVENTIONS:  - Assess patient frequently for physical needs  -  Identify cognitive and physical deficits and behaviors that affect risk of falls    - [place] fall precautions as indicated by assessment   - Educate patient/family on patient safety including physical limitations  - Instruct patient to call for assistance with activity based on assessment  - Modify environment to reduce risk of injury  - Consider OT/PT consult to assist with strengthening/mobility  6/1/2021 0757 by Ivan Selby RN  Outcome: Not Progressing  5/31/2021 1850 by Ivan Selby RN  Outcome: Progressing  Goal: Maintain or return to baseline ADL function  Description: INTERVENTIONS:  -  Assess patient's ability to carry out ADLs; assess patient's baseline for ADL function and identify physical deficits which impact ability to perform ADLs (bathing, care of mouth/teeth, toileting, grooming, dressing, etc )  - Assess/evaluate cause of self-care deficits   - Assess range of motion  - Assess patient's mobility; develop plan if impaired  - Assess patient's need for assistive devices and provide as appropriate  - Encourage maximum independence but intervene and supervise when necessary  - Involve family in performance of ADLs  - Assess for home care needs following discharge   - Consider OT consult to assist with ADL evaluation and planning for discharge  - Provide patient education as appropriate  6/1/2021 0757 by Ivan Selby RN  Outcome: Not Progressing  5/31/2021 1850 by Ivan Selby RN  Outcome: Progressing  Goal: Maintain or return mobility status to optimal level  Description: INTERVENTIONS:  - Assess patient's baseline mobility status (ambulation, transfers, stairs, etc )    - Identify cognitive and physical deficits and behaviors that affect mobility  - Identify mobility aids required to assist with transfers and/or ambulation (gait belt, sit-to-stand, lift, walker, cane, etc )  - [place] fall precautions as indicated by assessment  - Record patient progress and toleration of activity level on Mobility SBAR; progress patient to next Phase/Stage  - Instruct patient to call for assistance with activity based on assessment  - Consider rehabilitation consult to assist with strengthening/weightbearing, etc   6/1/2021 0757 by Zaheer Ring RN  Outcome: Not Progressing  5/31/2021 1850 by Zaheer Ring RN  Outcome: Progressing     Problem: DISCHARGE PLANNING  Goal: Discharge to home or other facility with appropriate resources  Description: INTERVENTIONS:  - Identify barriers to discharge w/patient and caregiver  - Arrange for needed discharge resources and transportation as appropriate  - Identify discharge learning needs (meds, wound care, etc )  - Arrange for interpretive services to assist at discharge as needed  - Refer to Case Management Department for coordinating discharge planning if the patient needs post-hospital services based on physician/advanced practitioner order or complex needs related to functional status, cognitive ability, or social support system  6/1/2021 0757 by Zaheer Ring RN  Outcome: Not Progressing  5/31/2021 1850 by Zaheer Ring RN  Outcome: Progressing     Problem: Knowledge Deficit  Goal: Patient/family/caregiver demonstrates understanding of disease process, treatment plan, medications, and discharge instructions  Description: Complete learning assessment and assess knowledge base    Interventions:  - Provide teaching at level of understanding  - Provide teaching via preferred learning methods  6/1/2021 0757 by Zaheer Ring RN  Outcome: Not Progressing  5/31/2021 1850 by Zaheer Ring RN  Outcome: Progressing     Problem: Prexisting or High Potential for Compromised Skin Integrity  Goal: Skin integrity is maintained or improved  Description: INTERVENTIONS:  - Identify patients at risk for skin breakdown  - Assess and monitor skin integrity  - Assess and monitor nutrition and hydration status  - Monitor labs   - Assess for incontinence   - Turn and reposition patient  - Assist with mobility/ambulation  - Relieve pressure over bony prominences  - Avoid friction and shearing  - Provide appropriate hygiene as needed including keeping skin clean and dry  - Evaluate need for skin moisturizer/barrier cream  - Collaborate with interdisciplinary team   - Patient/family teaching  - Consider wound care consult   Outcome: Not Progressing     Problem: Potential for Falls  Goal: Patient will remain free of falls  Description: INTERVENTIONS:  - Assess patient frequently for physical needs  -  Identify cognitive and physical deficits and behaviors that affect risk of falls    -  [place] fall precautions as indicated by assessment   - Educate patient/family on patient safety including physical limitations  - Instruct patient to call for assistance with activity based on assessment  - Modify environment to reduce risk of injury  - Consider OT/PT consult to assist with strengthening/mobility  6/1/2021 0757 by Arabella Houston RN  Outcome: Not Progressing  5/31/2021 1850 by Arabella Houston RN  Outcome: Progressing

## 2021-06-01 NOTE — PHYSICAL THERAPY NOTE
Physical Therapy Cancellation Note       06/01/21 0802   PT Last Visit   PT Visit Date 06/01/21   Note Type   Note type Evaluation   Cancel Reasons Patient to operating room     PT orders received, pt chart reviewed  Pt currently to OR receiving "open reduction with internal fixation (ORIF) tibia (right)"  PT to continue to follow and see pt as appropriate and able       Vella Gosselin, PT, DPT

## 2021-06-02 LAB
ANION GAP SERPL CALCULATED.3IONS-SCNC: 5 MMOL/L (ref 4–13)
BUN SERPL-MCNC: 10 MG/DL (ref 5–25)
CALCIUM SERPL-MCNC: 8.5 MG/DL (ref 8.3–10.1)
CHLORIDE SERPL-SCNC: 108 MMOL/L (ref 100–108)
CO2 SERPL-SCNC: 26 MMOL/L (ref 21–32)
CREAT SERPL-MCNC: 0.74 MG/DL (ref 0.6–1.3)
ERYTHROCYTE [DISTWIDTH] IN BLOOD BY AUTOMATED COUNT: 13.6 % (ref 11.6–15.1)
GFR SERPL CREATININE-BSD FRML MDRD: 93 ML/MIN/1.73SQ M
GLUCOSE SERPL-MCNC: 131 MG/DL (ref 65–140)
HCT VFR BLD AUTO: 39.1 % (ref 36.5–49.3)
HGB BLD-MCNC: 12.9 G/DL (ref 12–17)
MCH RBC QN AUTO: 31 PG (ref 26.8–34.3)
MCHC RBC AUTO-ENTMCNC: 33 G/DL (ref 31.4–37.4)
MCV RBC AUTO: 94 FL (ref 82–98)
PLATELET # BLD AUTO: 174 THOUSANDS/UL (ref 149–390)
PMV BLD AUTO: 9.3 FL (ref 8.9–12.7)
POTASSIUM SERPL-SCNC: 4.1 MMOL/L (ref 3.5–5.3)
RBC # BLD AUTO: 4.16 MILLION/UL (ref 3.88–5.62)
SODIUM SERPL-SCNC: 139 MMOL/L (ref 136–145)
WBC # BLD AUTO: 9.97 THOUSAND/UL (ref 4.31–10.16)

## 2021-06-02 PROCEDURE — 85027 COMPLETE CBC AUTOMATED: CPT | Performed by: PHYSICIAN ASSISTANT

## 2021-06-02 PROCEDURE — 97163 PT EVAL HIGH COMPLEX 45 MIN: CPT

## 2021-06-02 PROCEDURE — 99232 SBSQ HOSP IP/OBS MODERATE 35: CPT | Performed by: SURGERY

## 2021-06-02 PROCEDURE — 97166 OT EVAL MOD COMPLEX 45 MIN: CPT

## 2021-06-02 PROCEDURE — 80048 BASIC METABOLIC PNL TOTAL CA: CPT | Performed by: PHYSICIAN ASSISTANT

## 2021-06-02 PROCEDURE — NC001 PR NO CHARGE: Performed by: ORTHOPAEDIC SURGERY

## 2021-06-02 RX ORDER — HYDROMORPHONE HCL/PF 1 MG/ML
0.5 SYRINGE (ML) INJECTION EVERY 6 HOURS PRN
Status: DISCONTINUED | OUTPATIENT
Start: 2021-06-02 | End: 2021-06-03 | Stop reason: HOSPADM

## 2021-06-02 RX ADMIN — DOCUSATE SODIUM 100 MG: 100 CAPSULE ORAL at 17:11

## 2021-06-02 RX ADMIN — METHOCARBAMOL TABLETS 750 MG: 750 TABLET, COATED ORAL at 23:07

## 2021-06-02 RX ADMIN — DOCUSATE SODIUM 100 MG: 100 CAPSULE ORAL at 08:41

## 2021-06-02 RX ADMIN — METHOCARBAMOL TABLETS 750 MG: 750 TABLET, COATED ORAL at 12:03

## 2021-06-02 RX ADMIN — METHOCARBAMOL TABLETS 750 MG: 750 TABLET, COATED ORAL at 17:11

## 2021-06-02 RX ADMIN — ENOXAPARIN SODIUM 30 MG: 30 INJECTION SUBCUTANEOUS at 08:41

## 2021-06-02 RX ADMIN — SENNOSIDES 17.2 MG: 8.6 TABLET ORAL at 08:41

## 2021-06-02 RX ADMIN — PANTOPRAZOLE SODIUM 40 MG: 40 TABLET, DELAYED RELEASE ORAL at 05:51

## 2021-06-02 RX ADMIN — ENOXAPARIN SODIUM 30 MG: 30 INJECTION SUBCUTANEOUS at 22:04

## 2021-06-02 RX ADMIN — METHOCARBAMOL TABLETS 750 MG: 750 TABLET, COATED ORAL at 05:51

## 2021-06-02 RX ADMIN — HYDROMORPHONE HYDROCHLORIDE 2 MG: 2 TABLET ORAL at 23:08

## 2021-06-02 NOTE — OCCUPATIONAL THERAPY NOTE
Occupational Therapy Evaluation      Mel Russell    6/2/2021    Principal Problem:    Tibia/fibula fracture, right, closed, initial encounter  Active Problems:    Fall from height of greater than 3 feet    Closed fracture of calcaneus    Acute pain due to trauma    Acute pain of right shoulder      History reviewed  No pertinent past medical history  Past Surgical History:   Procedure Laterality Date    ORIF TIBIA FRACTURE Right 6/1/2021    Procedure: OPEN REDUCTION W/ INTERNAL FIXATION (ORIF) TIBIA;  Surgeon: Itzel Lujan MD;  Location: [facility];  Service: Orthopedics        06/02/21 1030   OT Last Visit   OT Visit Date 06/02/21   Note Type   Note type Evaluation   Restrictions/Precautions   Weight Bearing Precautions Per Order Yes   RLE Weight Bearing Per Order NWB   Other Precautions Fall Risk;WBS;Chair Alarm   Pain Assessment   Pain Assessment Tool 0-10   Pain Score No Pain   Home Living   Type of Home House   Home Layout Two level;1/2 bath on main level   Bathroom Shower/Tub Walk-in shower   Bathroom Toilet Raised   Bathroom Equipment Grab bars in shower   Additional Comments pt reports being able to stay on main floor, sleeping on the sofa  Has available RW and BSC and shower chair   Prior Function   Level of [place] Independent with ADLs and functional mobility   Lives With Spouse   Receives Help From Family   ADL Assistance Independent   IADLs Independent   Falls in the last 6 months 1 to 4   Vocational Retired   Comments pt reports this one fall only      Lifestyle   Autonomy Yin Fine reports being fully independent w self care, mobility, home management, driving etc   Reciprocal Relationships supportive family   Intrinsic Gratification 'putter around" doing things   Psychosocial   Psychosocial (WDL) WDL   Subjective   Subjective "my wife is healthy and can help me"   ADL   Eating Assistance 7  Independent   Grooming Assistance 7  Independent   Grooming Deficit [facility]   [address] 5 Supervision/Setup   LB Bathing Assistance 4  Minimal Assistance   UB Dressing Assistance 5  Supervision/Setup   LB Dressing Assistance 3  Moderate [address]  4  Minimal Assistance   Bed Mobility   Additional Comments Pt  OOB in chair   Transfers   Sit to Stand 4  Minimal assistance   Stand to Sit 5  Supervision   Stand pivot 4  Minimal assistance   Additional Comments CGA needed for safe transfers   Functional Mobility   Functional Mobility 4  Minimal assistance   Additional items Rolling walker   Balance   Static Sitting Good   Dynamic Sitting Fair +   Static Standing Fair -   Dynamic Standing Fair -   Activity Tolerance   Activity Tolerance Patient tolerated treatment well   RUE Assessment   RUE Assessment WFL   LUE Assessment   LUE Assessment WFL   Hand Function   Gross Motor Coordination Functional   Fine Motor Coordination Functional   Sensation   Light Touch No apparent deficits   Cognition   Overall Cognitive Status WFL   Arousal/Participation Alert; Cooperative   Attention Within functional limits   Orientation Level Oriented X4   Memory Within functional limits   Following Commands Follows all commands and directions without difficulty   Assessment   Limitation Decreased ADL status; Decreased self-care trans;Decreased high-level ADLs   Assessment Pt is a 80 y o  male seen for OT evaluation s/p admit to [facility] on 5/[facility]21 w/ Tibia/fibula fracture, right, closed, initial encounter  Pt s/p fall from roof (approx  10 feet), resulting in fracture of R tib/fib as well as R calcaneus fx  Pt underwent ORIF R distal tibia 6/1/21  He is to be NWB RLE  Comorbidities affecting pt's functional performance at time of assessment include: pain  Personal factors affecting pt at time of IE include:steps to enter environment, difficulty performing ADLS and difficulty performing IADLS    Prior to admission, pt was independent with all selfcare, mobility, home management etc  Upon evaluation: Pt requires assist w LB selfcare, transfers 2* the following deficits impacting occupational performance: decreased balance, decreased tolerance and orthopedic restrictions  Pt to benefit from continued skilled OT tx while in the hospital to address deficits as defined above and maximize level of functional independence w ADL's and functional mobility  Occupational Performance areas to address include: functional mobility and clothing management  Based on findings from OT evaluation and functional performance deficits, pt has been identified as a  moderate complexity evaluation  The patient's raw score on the AM-PAC Daily Activity inpatient short form is 21, standardized score is 44 27, greater than 39 4  Patients at this level are likely to benefit from discharge to home  From OT standpoint, recommendation at time of d/c would be home  Pt reports wife is able to assist as needed  He also report having necessary DME available at home  Goals   Patient Goals to go home   STG Time Frame   (7 days)   Short Term Goal #1 pt will complete LB self care w S   Short Term Goal #2 tolerate standing x 5 min, w use of RW, NWB RLE for completion of hygiene   Short Term Goal  functional transfers mod I w good safety   Long Term Goal #1 functional mobility mod I w good safety ,maintaining NWB RLE   Long Term Goal #2 demonstrate good ECT/self pacing skills w ADLS/mobility   Plan   Treatment Interventions ADL retraining;Functional transfer training; Endurance training;Patient/family training; Compensatory technique education; Energy conservation; Activityengagement   Goal Expiration Date 06/09/21   OT Frequency 3-5x/wk   Recommendation   OT Discharge Recommendation No rehabilitation needs   OT - OK to Discharge Yes   AM-PAC Daily Activity Inpatient   Lower Body Dressing 3   Bathing 3   Toileting 3   Upper Body Dressing 4   Grooming 4   Eating 4   Daily Activity Raw Score 21   Daily Activity Standardized Score (Calc for Raw Score >=11) 44 27

## 2021-06-02 NOTE — ASSESSMENT & PLAN NOTE
- Mildly displaced comminuted right calcaneal fracture, present on admission   - Appreciate Orthopedic surgery evaluation and recommendations  Anticipate delayed operative intervention, likely on an elective basis secondary to swelling   - Maintain non-weightbearing status on the right lower extremity in bulky Alonso dressing   - Monitor right lower extremity neurovascular exam   - Continue multimodal analgesic regimen   - Continue DVT prophylaxis  - PT and OT evaluation and treatment as indicated  - Outpatient follow up with Orthopedic surgery for re-evaluation

## 2021-06-02 NOTE — PLAN OF CARE
Problem: PHYSICAL THERAPY ADULT  Goal: Performs mobility at highest level of function for planned discharge setting  See evaluation for individualized goals  Description: Treatment/Interventions: Functional transfer training, LE strengthening/ROM, Elevations, Therapeutic exercise, Endurance training, Patient/family training, Equipment eval/education, Bed mobility, Gait training  Equipment Recommended: Sneha Ledesma       See flowsheet documentation for full assessment, interventions and recommendations  Note: Prognosis: Good  Problem List: Decreased strength, Decreased endurance, Impaired balance, Decreased mobility, Decreased safety awareness, Orthopedic restrictions, Pain  Assessment: Pt is a 80 y o  male seen for PT evaluation s/p admit to [facility] on 5/31/2021  Pt was admitted with a primary dx of: Fall off roof resulting in R tibia/fibular fx s/p ORIF R tibia on 6/1, additional R calcaneal fx (non-operative management)  Pt now NWB R LE  PT now consulted for assessment of mobility and d/c needs  Pt with Up with assistance orders  Pts current comorbidities effecting treatment include: Multi-level home with stair to enter, active at baseline without AD, TKA  Pts current clinical presentation is Unstable/ Unpredictable (high complexity) due to Ongoing medical management for primary dx, Increased reliance on more restrictive AD compared to baseline, Decreased activity tolerance compared to baseline, Fall risk, Increased assistance needed from caregiver at current time, Current WBS, Trending lab values  Prior to admission, pt was independent with all mobility  Upon evaluation, pt currently is requiring supervision for bed mobility; Valentín for transfers and Valentín for ambulation 30 ft w/ RW   Pt presents at PT eval functioning below baseline and currently w/ overall mobility deficits 2* to: impaired balance, decreased endurance, gait deviations, pain, decreased activity tolerance compared to baseline, decreased functional mobility tolerance compared to baseline, fall risk, orthopedic restrictions  Pt currently at a fall risk 2* to impairments listed above  Pt will continue to benefit from skilled acute PT interventions to address stated impairments; to maximize functional mobility; for ongoing pt/ family training; and DME needs  At conclusion of PT session pt returned back in chair and chair alarm engaged with phone and call bell within reach  Pt denies any further questions at this time  Recommend home with family care upon hospital D/C  PT Discharge Recommendation: No rehabilitation needs     PT - OK to Discharge: Yes    See flowsheet documentation for full assessment

## 2021-06-02 NOTE — ASSESSMENT & PLAN NOTE
- Fall from approximately 10 ft off of a roof with the below noted injuries  - Fall precautions   - PT and OT evaluation and treatment as indicated postoperatively  - Case Management following for disposition planning

## 2021-06-02 NOTE — ASSESSMENT & PLAN NOTE
- Comminuted mildly displaced right distal tibial fracture with intra-articular extension, present on admission   - Patient is status post ORIF of the right distal tibial fracture on 06/01/2021  - Appreciate Orthopedic surgery evaluation, recommendations and intervention as noted  - Maintain non-weightbearing status on the right lower extremity in bulky Alonso dressing   - Monitor right lower extremity neurovascular exam   - Continue multimodal analgesic regimen   - Continue DVT prophylaxis for 28 days with lovenox   - PT and OT evaluation and treatment as indicated  - Outpatient follow up with Orthopedic surgery for re-evaluation

## 2021-06-02 NOTE — ASSESSMENT & PLAN NOTE
- Fall from approximately 10 ft off of a roof with the below noted injuries  - Fall precautions   - PT and OT evaluation appreciated  Recommended for discharge home   -case management following  Plan to discharge home on 06/03/2021

## 2021-06-02 NOTE — ASSESSMENT & PLAN NOTE
- Comminuted mildly displaced right distal tibial fracture with intra-articular extension, present on admission   - Patient is status post ORIF of the right distal tibial fracture on 06/01/2021  - Appreciate Orthopedic surgery evaluation, recommendations and intervention as noted  - Maintain non-weightbearing status on the right lower extremity in bulky Alonso dressing   - Monitor right lower extremity neurovascular exam   - Continue multimodal analgesic regimen   - Continue DVT prophylaxis  - PT and OT evaluation and treatment as indicated  - Outpatient follow up with Orthopedic surgery for re-evaluation

## 2021-06-02 NOTE — ASSESSMENT & PLAN NOTE
- Acute pain secondary to multiple traumatic injuries  - Continue multimodal analgesic regimen and adjust as indicated  - Bowel regimen while on opioid therapy

## 2021-06-02 NOTE — ASSESSMENT & PLAN NOTE
- Acute right shoulder pain following fall from height   - No evidence of acute osseous injury on x-ray  - Likely secondary to contusion   - Activity as tolerated with weight-bearing as tolerated on the right upper extremity  - Continue multimodal analgesic regimen   - PT and OT evaluation and treatment as indicated

## 2021-06-02 NOTE — PROGRESS NOTES
Subjective: No acute events overnight  No acute distress  Resting in bed    Objective:  A 10 point ROS was performed; negative except as noted above  Lab Results   Component Value Date/Time    WBC 9 97 06/02/2021 05:57 AM    HGB 12 9 06/02/2021 05:57 AM       Vitals:    06/02/21 0246   BP: 139/67   Pulse: 63   Resp: 16   Temp: 98 7 °F (37 1 °C)   SpO2: 95%     Right lower extremity  Dressing C/D/I  Moderate foot swelling  Moving toes spontaneously  Sensation intact to distal aspect of toes visible in splint  Toes warm and well perfused with brisk capillary refill    Assessment: 80 y o  male POD 1 ORIF R Distal Tibia   Also with Right calcaneus fracture    Plan:  NWB RLE in splint  Continue aggressive ice and elevation  Daily skin checks-will plan for fixation of the calcaneus once swelling decreases  Pain control  DVT ppx: Enoxaparin (Lovenox)  PT/OT  Will continue to assess for acute blood loss anemia  Dispo: Ortho will follow

## 2021-06-02 NOTE — PLAN OF CARE
Problem: PAIN - ADULT  Goal: Verbalizes/displays adequate comfort level or baseline comfort level  Description: Interventions:  - Encourage patient to monitor pain and request assistance  - Assess pain using appropriate pain scale  - Administer analgesics based on type and severity of pain and evaluate response  - Implement non-pharmacological measures as appropriate and evaluate response  - Consider cultural and social influences on pain and pain management  - Notify physician/advanced practitioner if interventions unsuccessful or patient reports new pain  Outcome: Progressing     Problem: INFECTION - ADULT  Goal: Absence or prevention of progression during hospitalization  Description: INTERVENTIONS:  - Assess and monitor for signs and symptoms of infection  - Monitor lab/diagnostic results  - Monitor all insertion sites, i e  indwelling lines, tubes, and drains  - Monitor endotracheal if appropriate and nasal secretions for changes in amount and color  - [place] appropriate cooling/warming therapies per order  - Administer medications as ordered  - Instruct and encourage patient and family to use good hand hygiene technique  - Identify and instruct in appropriate isolation precautions for identified infection/condition  Outcome: Progressing  Goal: Absence of fever/infection during neutropenic period  Description: INTERVENTIONS:  - Monitor WBC    Outcome: Progressing     Problem: SAFETY ADULT  Goal: Patient will remain free of falls  Description: INTERVENTIONS:  - Assess patient frequently for physical needs  -  Identify cognitive and physical deficits and behaviors that affect risk of falls    -  [place] fall precautions as indicated by assessment   - Educate patient/family on patient safety including physical limitations  - Instruct patient to call for assistance with activity based on assessment  - Modify environment to reduce risk of injury  - Consider OT/PT consult to assist with strengthening/mobility  Outcome: Progressing  Goal: Maintain or return to baseline ADL function  Description: INTERVENTIONS:  -  Assess patient's ability to carry out ADLs; assess patient's baseline for ADL function and identify physical deficits which impact ability to perform ADLs (bathing, care of mouth/teeth, toileting, grooming, dressing, etc )  - Assess/evaluate cause of self-care deficits   - Assess range of motion  - Assess patient's mobility; develop plan if impaired  - Assess patient's need for assistive devices and provide as appropriate  - Encourage maximum independence but intervene and supervise when necessary  - Involve family in performance of ADLs  - Assess for home care needs following discharge   - Consider OT consult to assist with ADL evaluation and planning for discharge  - Provide patient education as appropriate  Outcome: Progressing  Goal: Maintain or return mobility status to optimal level  Description: INTERVENTIONS:  - Assess patient's baseline mobility status (ambulation, transfers, stairs, etc )    - Identify cognitive and physical deficits and behaviors that affect mobility  - Identify mobility aids required to assist with transfers and/or ambulation (gait belt, sit-to-stand, lift, walker, cane, etc )  - [place] fall precautions as indicated by assessment  - Record patient progress and toleration of activity level on Mobility SBAR; progress patient to next Phase/Stage  - Instruct patient to call for assistance with activity based on assessment  - Consider rehabilitation consult to assist with strengthening/weightbearing, etc   Outcome: Progressing     Problem: DISCHARGE PLANNING  Goal: Discharge to home or other facility with appropriate resources  Description: INTERVENTIONS:  - Identify barriers to discharge w/patient and caregiver  - Arrange for needed discharge resources and transportation as appropriate  - Identify discharge learning needs (meds, wound care, etc )  - Arrange for interpretive services to assist at discharge as needed  - Refer to Case Management Department for coordinating discharge planning if the patient needs post-hospital services based on physician/advanced practitioner order or complex needs related to functional status, cognitive ability, or social support system  Outcome: Progressing     Problem: Knowledge Deficit  Goal: Patient/family/caregiver demonstrates understanding of disease process, treatment plan, medications, and discharge instructions  Description: Complete learning assessment and assess knowledge base  Interventions:  - Provide teaching at level of understanding  - Provide teaching via preferred learning methods  Outcome: Progressing     Problem: Prexisting or High Potential for Compromised Skin Integrity  Goal: Skin integrity is maintained or improved  Description: INTERVENTIONS:  - Identify patients at risk for skin breakdown  - Assess and monitor skin integrity  - Assess and monitor nutrition and hydration status  - Monitor labs   - Assess for incontinence   - Turn and reposition patient  - Assist with mobility/ambulation  - Relieve pressure over bony prominences  - Avoid friction and shearing  - Provide appropriate hygiene as needed including keeping skin clean and dry  - Evaluate need for skin moisturizer/barrier cream  - Collaborate with interdisciplinary team   - Patient/family teaching  - Consider wound care consult   Outcome: Progressing     Problem: Potential for Falls  Goal: Patient will remain free of falls  Description: INTERVENTIONS:  - Assess patient frequently for physical needs  -  Identify cognitive and physical deficits and behaviors that affect risk of falls    -  [place] fall precautions as indicated by assessment   - Educate patient/family on patient safety including physical limitations  - Instruct patient to call for assistance with activity based on assessment  - Modify environment to reduce risk of injury  - Consider OT/PT consult to assist with strengthening/mobility  Outcome: Progressing

## 2021-06-02 NOTE — PLAN OF CARE
Problem: OCCUPATIONAL THERAPY ADULT  Goal: Performs self-care activities at highest level of function for planned discharge setting  See evaluation for individualized goals  Note: Limitation: Decreased ADL status, Decreased self-care trans, Decreased high-level ADLs     Assessment: Pt is a 80 y o  male seen for OT evaluation s/p admit to [facility] on 5/31/2021 w/ Tibia/fibula fracture, right, closed, initial encounter  Pt s/p fall from roof (approx  10 feet), resulting in fracture of R tib/fib as well as R calcaneus fx  Pt underwent ORIF R distal tibia 6/1/21  He is to be NWB RLE  Comorbidities affecting pt's functional performance at time of assessment include: pain  Personal factors affecting pt at time of IE include:steps to enter environment, difficulty performing ADLS and difficulty performing IADLS   Prior to admission, pt was independent with all selfcare, mobility, home management etc  Upon evaluation: Pt requires assist w LB selfcare, transfers 2* the following deficits impacting occupational performance: decreased balance, decreased tolerance and orthopedic restrictions  Pt to benefit from continued skilled OT tx while in the hospital to address deficits as defined above and maximize level of functional independence w ADL's and functional mobility  Occupational Performance areas to address include: functional mobility and clothing management  Based on findings from OT evaluation and functional performance deficits, pt has been identified as a  moderate complexity evaluation  The patient's raw score on the AM-PAC Daily Activity inpatient short form is 21, standardized score is 44 27, greater than 39 4  Patients at this level are likely to benefit from discharge to home  From OT standpoint, recommendation at time of d/c would be home  Pt reports wife is able to assist as needed  He also report having necessary DME available at home         OT Discharge Recommendation: No rehabilitation needs  OT - OK to Discharge:  Yes

## 2021-06-02 NOTE — PROGRESS NOTES
[address] [facility]  Progress Note - Buckley Schlatter 1950, 80 y o  male MRN: [id number]  Unit/Bed#: [facility] 813-01 Encounter: [id number]  Primary Care Provider: No primary care provider on file  Date and time admitted to hospital: 5/31/2021 10:06 AM    Fall from height of greater than 3 feet  Assessment & Plan  - Fall from approximately 10 ft off of a roof with the below noted injuries  - Fall precautions   - PT and OT evaluation appreciated  Recommended for discharge home   -case management following  Plan to discharge home on 06/03/2021  * Tibia/fibula fracture, right, closed, initial encounter  Assessment & Plan  - Comminuted mildly displaced right distal tibial fracture with intra-articular extension, present on admission   - Patient is status post ORIF of the right distal tibial fracture on 06/01/2021  - Appreciate Orthopedic surgery evaluation, recommendations and intervention as noted  - Maintain non-weightbearing status on the right lower extremity in bulky Alonso dressing   - Monitor right lower extremity neurovascular exam   - Continue multimodal analgesic regimen   - Continue DVT prophylaxis for 28 days with lovenox   - PT and OT evaluation and treatment as indicated  - Outpatient follow up with Orthopedic surgery for re-evaluation  Closed fracture of calcaneus  Assessment & Plan  - Mildly displaced comminuted right calcaneal fracture, present on admission   - Appreciate Orthopedic surgery evaluation and recommendations  Anticipate delayed operative intervention, likely on an elective basis secondary to swelling   - Maintain non-weightbearing status on the right lower extremity in bulky Alonso dressing   - Monitor right lower extremity neurovascular exam   - Continue multimodal analgesic regimen   - Continue DVT prophylaxis  - PT and OT evaluation and treatment as indicated  - Outpatient follow up with Orthopedic surgery for re-evaluation        Acute pain of right shoulder  Assessment & Plan  - Acute right shoulder pain following fall from height   - No evidence of acute osseous injury on x-ray  - Likely secondary to contusion   - Activity as tolerated with weight-bearing as tolerated on the right upper extremity  - Continue multimodal analgesic regimen   - PT and OT evaluation and treatment as indicated  Acute pain due to trauma  Assessment & Plan  - Acute pain secondary to multiple traumatic injuries  - Continue multimodal analgesic regimen and adjust as indicated  - Bowel regimen while on opioid therapy  Disposition:  Continue med surge status, anticipate discharge home on 06/03      SUBJECTIVE:  Chief Complaint:  I am feeling okay    Subjective:  Patient states his pain is controlled in the right lower extremity  He understands that he will likely need delayed surgery for his calcaneal fracture  He is tolerating a diet has been out of bed with physical therapy  He is motivated to get home but states that his family will not be able to get him and bring him home until 06/03/2021        OBJECTIVE:     Meds/Allergies     Current Facility-Administered Medications:     acetaminophen (TYLENOL) tablet 975 mg, 975 mg, Oral, Q8H [address], Naun Leon PA-C, 975 mg at 05/31/21 2110    docusate sodium (COLACE) capsule 100 mg, 100 mg, Oral, BID, Naun Leon PA-C, 100 mg at 06/02/21 0841    enoxaparin (LOVENOX) subcutaneous injection 30 mg, 30 mg, Subcutaneous, Q12H [address], Jaymie Islas MD, 30 mg at 06/02/21 0841    fentanyl citrate (PF) 100 MCG/2ML 50 mcg, 50 mcg, Intravenous, Once, Naun Leon PA-C    HYDROmorphone (DILAUDID) injection 0 5 mg, 0 5 mg, Intravenous, Q6H PRN, Mayank Chandra PA-C    HYDROmorphone (DILAUDID) tablet 2 mg, 2 mg, Oral, Q4H PRN, Naun Leon PA-C, 2 mg at 06/01/21 1954    HYDROmorphone (DILAUDID) tablet 4 mg, 4 mg, Oral, Q4H PRN, Naun Leon PA-C, 4 mg at 06/01/21 1128    methocarbamol (ROBAXIN) tablet 750 mg, 750 mg, Oral, Q6H [address], Leonard CHRISTINE Vega-ANTON, 750 mg at 06/02/21 1203    pantoprazole (PROTONIX) EC tablet 40 mg, 40 mg, Oral, Early Morning, Leonard VegaBAILEY, 40 mg at 06/02/21 0551    senna (SENOKOT) tablet 17 2 mg, 2 tablet, Oral, Daily, Leonard PerlaBAILEY arriaga, 17 2 mg at 06/02/21 0841     Vitals:   Vitals:    06/02/21 1518   BP: 131/68   Pulse: 65   Resp: 20   Temp: 98 4 °F (36 9 °C)   SpO2: 95%       Intake/Output:  I/O       05/31 0701 - 06/01 0700 06/01 0701 - 06/02 0700 06/02 0701 - 06/03 0700    P  O  120 820 240    I V  (mL/kg) 856 3 (10 5) 2192 5 (26 9) 937 (11 5)    IV Piggyback  50     Total Intake(mL/kg) 976 3 (12) 3062 5 (37 5) 1177 (14 4)    Urine (mL/kg/hr) 400 3375 (1 7) 830 (1 2)    Emesis/NG output 200      Total Output 600 3375 830    Net +376 3 -312 5 +347                  Nutrition/GI Proph/Bowel Reg:  Regular    Physical Exam:   GENERAL APPEARANCE:  No acute distress  NEURO:  GCS 15, nonfocal exam  HEENT:  Normocephalic, atraumatic  CV:  Regular rate and rhythm, no murmurs gallops or rubs  LUNGS:  Clear to auscultation bilaterally  GI:  Soft, nontender, nondistended  :  Voiding  MSK:  +right lower extremity a bulky Alonso dressing  Neurovascularly intact distally in the right lower extremity  No other edema, contusions or deformities    SKIN:  Pink, warm, dry    Invasive Devices     Peripheral Intravenous Line            Peripheral IV 05/31/21 Right Antecubital 2 days                 Lab Results:   Results: I have personally reviewed pertinent reports   , BMP/CMP:   Lab Results   Component Value Date    SODIUM 139 06/02/2021    K 4 1 06/02/2021     06/02/2021    CO2 26 06/02/2021    BUN 10 06/02/2021    CREATININE 0 74 06/02/2021    CALCIUM 8 5 06/02/2021    EGFR 93 06/02/2021    and CBC:   Lab Results   Component Value Date    WBC 9 97 06/02/2021    HGB 12 9 06/02/2021    HCT 39 1 06/02/2021    MCV 94 06/02/2021     06/02/2021    MCH 31 [facility] 06/02/2021    RDW 13 6 06/02/2021    MPV 9 3 06/02/2021     Imaging/EKG Studies: Results: I have personally reviewed pertinent reports      Other Studies:  No new  VTE Prophylaxis: Sequential compression device (Venodyne)  and Enoxaparin (Lovenox)

## 2021-06-02 NOTE — PHYSICAL THERAPY NOTE
Physical Therapy Evaluation    Patient's Name: Patricio Brandon    Admitting Diagnosis  Trauma [T14 90XA]  Closed fracture of right ankle, initial encounter [S82 891A]  Closed displaced comminuted fracture of shaft of right tibia, initial encounter [S82 251A]  Closed displaced fracture of right calcaneus, unspecified portion of calcaneus, initial encounter [S92 001A]    Problem List  Patient Active Problem List   Diagnosis    Gastroesophageal reflux disease    Fall from height of greater than 3 feet    Tibia/fibula fracture, right, closed, initial encounter    Closed fracture of calcaneus    Acute pain due to trauma    Acute pain of right shoulder       Past Medical History  History reviewed  No pertinent past medical history  Past Surgical History  Past Surgical History:   Procedure Laterality Date    ORIF TIBIA FRACTURE Right 6/1/2021    Procedure: OPEN REDUCTION W/ INTERNAL FIXATION (ORIF) TIBIA;  Surgeon: Nu Hager MD;  [facility];  Service: Orthopedics        06/02/21 0270   PT Last Visit   PT Visit Date 06/02/21   Note Type   Note type Evaluation   Pain Assessment   Pain Assessment Tool 0-10   Pain Score 6  (0/10 at rest, 6/10 with movement)   Pain Location/Orientation Orientation: Right;Location: Leg   Hospital Pain Intervention(s) Repositioned; Ambulation/increased activity; Elevated   Home Living   Type of [address] [place] [address] Two level; Able to live on main level with bedroom/bathroom;Stairs to enter without rails  (1 threshold step)   Additional Comments Pt does not own DME, but states his spouse will ask friends to borrow RW and W/C   Prior Function   Level of [place] Independent with ADLs and functional mobility   Lives With Spouse   Falls in the last 6 months 1 to 4  (1-leading to admission, fall off roof)   Vocational Retired   Comments Pt states he is typically very active and independent   Restrictions/Precautions   [facility] Bearing Precautions Per Order Yes   RLE Weight Bearing Per Order NWB  (in splint)   Other Precautions Chair Alarm; Fall Risk;Pain;WBS   General   Family/Caregiver Present No   Cognition   Overall Cognitive Status WFL   Orientation Level Oriented X4   Following Commands Follows all commands and directions without difficulty   Comments Pt very pleasant, cooperative and motivated to participate   RLE Assessment   RLE Assessment WFL  (with exception of R ankle splinted)   LLE Assessment   LLE Assessment WFL   Light Touch   RLE Light Touch Grossly intact   LLE Light Touch Grossly intact   Bed Mobility   Supine to Sit 5  Supervision   Additional items Increased time required   Additional Comments Good control of R LE   Transfers   Sit to Stand 4  Minimal assistance   Additional items Assist x 1; Increased time required;Verbal cues   Stand to Sit 4  Minimal assistance   Additional items Assist x 1; Increased time required;Verbal cues   Additional Comments VC's for hand placement upon sit to stand transfers with RW   Ambulation/Elevation   Gait pattern Decreased foot clearance; Improper Weight shift; Short stride   Gait Assistance 4  Minimal assist   Additional items Assist x 1   Assistive Device Rolling walker   Distance 30 ft, good control, cues for safety wtih rotational turns, maintains NWB status appropriately   Stair Management Assistance Not tested  (pt declined at this time, PT demonstrated)   Balance   Static Sitting Good   Dynamic Sitting Fair +   Static Standing Fair   Dynamic Standing Fair -   Ambulatory Poor +   Activity Tolerance   Activity Tolerance Patient limited by pain   Nurse Made Aware RN updated  Chair alarm engaged   Assessment   Prognosis Good   Problem List Decreased strength;Decreased endurance; Impaired balance;Decreased mobility; Decreased safety awareness;Orthopedic restrictions;Pain   Assessment Pt is a 80 y o  male seen for PT evaluation s/p admit to [facility] on 5/31/2021  Pt was admitted with a primary dx of:  Fall off roof resulting in R tibia/fibular fx s/p ORIF R tibia on 6/1, additional R calcaneal fx (non-operative management)  Pt now NWB R LE  PT now consulted for assessment of mobility and d/c needs  Pt with Up with assistance orders  Pts current comorbidities effecting treatment include: Multi-level home with stair to enter, active at baseline without AD, TKA  Pts current clinical presentation is Unstable/ Unpredictable (high complexity) due to Ongoing medical management for primary dx, Increased reliance on more restrictive AD compared to baseline, Decreased activity tolerance compared to baseline, Fall risk, Increased assistance needed from caregiver at current time, Current WBS, Trending lab values  Prior to admission, pt was independent with all mobility  Upon evaluation, pt currently is requiring supervision for bed mobility; Valentín for transfers and Valentín for ambulation 30 ft w/ RW  Pt presents at PT eval functioning below baseline and currently w/ overall mobility deficits 2* to: impaired balance, decreased endurance, gait deviations, pain, decreased activity tolerance compared to baseline, decreased functional mobility tolerance compared to baseline, fall risk, orthopedic restrictions  Pt currently at a fall risk 2* to impairments listed above  Pt will continue to benefit from skilled acute PT interventions to address stated impairments; to maximize functional mobility; for ongoing pt/ family training; and DME needs  At conclusion of PT session pt returned back in chair and chair alarm engaged with phone and call bell within reach  Pt denies any further questions at this time  Recommend home with family care upon hospital D/C  Goals   Patient Goals to go home   STG Expiration Date 06/16/21   Short Term Goal #1 In 14 days pt will be able to: 1  Demonstrate ability to perform all aspects of bed mobility independently to increase functional independence    2  Perform functional transfers with RW independently to facilitate safe return to previous living environment  3   Ambulate 100 ft with RW independently with stable vitals to improve safety with household distances and reduce fall risk  4  Improve LE strength grades by 1 to increase ease of functional mobility with transfers and gait  5  Pt will demonstrate improved balance by one grade in order to decrease risk of falls  6  Climb 1 step with RW and supervision to simulate entrance to home  PT Treatment Day 0   Plan   Treatment/Interventions Functional transfer training;LE strengthening/ROM; Elevations; Therapeutic exercise; Endurance training;Patient/family training;Equipment eval/education; Bed mobility;Gait training   PT Frequency Other (Comment)  (3-6x/week)   Recommendation   PT Discharge Recommendation No rehabilitation needs   Equipment Recommended [facility] Recommended Wheeled walker   Change/add to Snappy shuttle?  No   PT - OK to Discharge Yes   AM-PAC Basic Mobility Inpatient   Turning in Bed Without Bedrails 4   Lying on Back to Sitting on Edge of Flat Bed 3   Moving Bed to Chair 3   Standing Up From Chair 3   Walk in Room 3   Climb 3-5 Stairs 3   Basic Mobility Inpatient Raw Score 19   Basic Mobility Standardized Score 42 48       Adela Beyer, PT, DPT

## 2021-06-03 VITALS
HEART RATE: 67 BPM | BODY MASS INDEX: 25.2 KG/M2 | DIASTOLIC BLOOD PRESSURE: 55 MMHG | SYSTOLIC BLOOD PRESSURE: 123 MMHG | RESPIRATION RATE: 20 BRPM | HEIGHT: 71 IN | OXYGEN SATURATION: 94 % | WEIGHT: 180 LBS | TEMPERATURE: 98.2 F

## 2021-06-03 LAB
ANION GAP SERPL CALCULATED.3IONS-SCNC: 5 MMOL/L (ref 4–13)
BUN SERPL-MCNC: 12 MG/DL (ref 5–25)
CALCIUM SERPL-MCNC: 8.7 MG/DL (ref 8.3–10.1)
CHLORIDE SERPL-SCNC: 105 MMOL/L (ref 100–108)
CO2 SERPL-SCNC: 28 MMOL/L (ref 21–32)
CREAT SERPL-MCNC: 0.76 MG/DL (ref 0.6–1.3)
ERYTHROCYTE [DISTWIDTH] IN BLOOD BY AUTOMATED COUNT: 13.6 % (ref 11.6–15.1)
GFR SERPL CREATININE-BSD FRML MDRD: 92 ML/MIN/1.73SQ M
GLUCOSE SERPL-MCNC: 121 MG/DL (ref 65–140)
HCT VFR BLD AUTO: 40.2 % (ref 36.5–49.3)
HGB BLD-MCNC: 13.2 G/DL (ref 12–17)
MCH RBC QN AUTO: 31.5 PG (ref 26.8–34.3)
MCHC RBC AUTO-ENTMCNC: 32.8 G/DL (ref 31.4–37.4)
MCV RBC AUTO: 96 FL (ref 82–98)
PLATELET # BLD AUTO: 181 THOUSANDS/UL (ref 149–390)
PMV BLD AUTO: 9.4 FL (ref 8.9–12.7)
POTASSIUM SERPL-SCNC: 4.1 MMOL/L (ref 3.5–5.3)
RBC # BLD AUTO: 4.19 MILLION/UL (ref 3.88–5.62)
SODIUM SERPL-SCNC: 138 MMOL/L (ref 136–145)
WBC # BLD AUTO: 8.07 THOUSAND/UL (ref 4.31–10.16)

## 2021-06-03 PROCEDURE — 99238 HOSP IP/OBS DSCHRG MGMT 30/<: CPT | Performed by: SURGERY

## 2021-06-03 PROCEDURE — NC001 PR NO CHARGE: Performed by: SURGERY

## 2021-06-03 PROCEDURE — 80048 BASIC METABOLIC PNL TOTAL CA: CPT | Performed by: PHYSICIAN ASSISTANT

## 2021-06-03 PROCEDURE — 85027 COMPLETE CBC AUTOMATED: CPT | Performed by: PHYSICIAN ASSISTANT

## 2021-06-03 RX ORDER — METHOCARBAMOL 750 MG/1
750 TABLET, FILM COATED ORAL EVERY 6 HOURS SCHEDULED
Qty: 60 TABLET | Refills: 0 | Status: SHIPPED | OUTPATIENT
Start: 2021-06-03 | End: 2021-06-17 | Stop reason: SDUPTHER

## 2021-06-03 RX ORDER — HYDROMORPHONE HYDROCHLORIDE 2 MG/1
2 TABLET ORAL EVERY 4 HOURS PRN
Qty: 30 TABLET | Refills: 0 | Status: SHIPPED | OUTPATIENT
Start: 2021-06-03 | End: 2021-06-13

## 2021-06-03 RX ORDER — SENNOSIDES 8.6 MG
17.2 TABLET ORAL DAILY
Qty: 10 TABLET | Refills: 0 | Status: SHIPPED | OUTPATIENT
Start: 2021-06-04 | End: 2021-08-26 | Stop reason: ALTCHOICE

## 2021-06-03 RX ORDER — ACETAMINOPHEN 325 MG/1
975 TABLET ORAL EVERY 8 HOURS SCHEDULED
Qty: 30 TABLET | Refills: 0 | Status: SHIPPED | OUTPATIENT
Start: 2021-06-03

## 2021-06-03 RX ADMIN — METHOCARBAMOL TABLETS 750 MG: 750 TABLET, COATED ORAL at 11:40

## 2021-06-03 RX ADMIN — DOCUSATE SODIUM 100 MG: 100 CAPSULE ORAL at 09:20

## 2021-06-03 RX ADMIN — METHOCARBAMOL TABLETS 750 MG: 750 TABLET, COATED ORAL at 05:29

## 2021-06-03 RX ADMIN — SENNOSIDES 17.2 MG: 8.6 TABLET ORAL at 09:20

## 2021-06-03 RX ADMIN — ENOXAPARIN SODIUM 30 MG: 30 INJECTION SUBCUTANEOUS at 09:20

## 2021-06-03 RX ADMIN — PANTOPRAZOLE SODIUM 40 MG: 40 TABLET, DELAYED RELEASE ORAL at 05:29

## 2021-06-03 NOTE — PROGRESS NOTES
[address] [facility]  Progress Note - Leatha Phipps 1950, 80 y o  male MRN: [id number]  [facility] Encounter: [id number]  Primary Care Provider: No primary care provider on file  Date and time admitted to hospital: 5/31/2021 10:06 AM    Acute pain of right shoulder  Assessment & Plan  - Acute right shoulder pain following fall from height   - No evidence of acute osseous injury on x-ray  - Likely secondary to contusion   - Activity as tolerated with weight-bearing as tolerated on the right upper extremity  - Continue multimodal analgesic regimen   - PT and OT evaluation and treatment as indicated  Acute pain due to trauma  Assessment & Plan  - Acute pain secondary to multiple traumatic injuries  - Continue multimodal analgesic regimen and adjust as indicated  - Continue bowel regimen-no bowel movements, but passing flatus    Fall from height of greater than 3 feet  Assessment & Plan  - Fall from approximately 10 ft off of a roof with the below noted injuries  - Fall precautions   - PT and OT evaluation appreciated  Recommended for discharge home   -case management following  * Tibia/fibula fracture, right, closed, initial encounter  Assessment & Plan  - Comminuted mildly displaced right distal tibial fracture with intra-articular extension, present on admission   - Patient is status post ORIF of the right distal tibial fracture on 06/01/2021  - Appreciate Orthopedic surgery evaluation, recommendations and intervention as noted  - Maintain non-weightbearing status on the right lower extremity in bulky Alonso dressing   - Monitor right lower extremity neurovascular exam   - Continue multimodal analgesic regimen   - Continue DVT prophylaxis for 28 days with lovenox   - PT and OT evaluation and treatment as indicated  - Outpatient follow up with Orthopedic surgery for re-evaluation            Disposition: Discharge home today      SUBJECTIVE:  Chief Complaint: "Doing better"    Subjective: No acute events overnight  Patient is doing well this morning  States pain is well controlled with pain medications  No nausea or vomiting  Has not had a bowel movement yet, but passing flatus  No fevers/chills  OBJECTIVE:     Meds/Allergies     Current Facility-Administered Medications:     acetaminophen (TYLENOL) tablet 975 mg, 975 mg, Oral, Q8H [address], CHRISTINE Gardner-ANTON, 975 mg at 05/31/21 2110    docusate sodium (COLACE) capsule 100 mg, 100 mg, Oral, BID, CHRISTINE Gardner-C, 100 mg at 06/02/21 1711    enoxaparin (LOVENOX) subcutaneous injection 30 mg, 30 mg, Subcutaneous, Q12H [address], Abby Burt MD, 30 mg at 06/02/21 2204    fentanyl citrate (PF) 100 MCG/2ML 50 mcg, 50 mcg, Intravenous, Once, Enrique Gonzalez PA-C    HYDROmorphone (DILAUDID) injection 0 5 mg, 0 5 mg, Intravenous, Q6H PRN, Erasmo Madsen PA-C    HYDROmorphone (DILAUDID) tablet 2 mg, 2 mg, Oral, Q4H PRN, CHRISTINE Gardner-C, 2 mg at 06/02/21 2308    HYDROmorphone (DILAUDID) tablet 4 mg, 4 mg, Oral, Q4H PRN, CHRISTINE Gardner-C, 4 mg at 06/01/21 1128    methocarbamol (ROBAXIN) tablet 750 mg, 750 mg, Oral, Q6H [address], Enrique Gonzalez PA-C, 750 mg at 06/03/21 0529    pantoprazole (PROTONIX) EC tablet 40 mg, 40 mg, Oral, Early Morning, CHRISTINE Gardner-ANTON, 40 mg at 06/03/21 0529    senna (SENOKOT) tablet 17 2 mg, 2 tablet, Oral, Daily, CHRISTINE Gardner-ANTON, 17 2 mg at 06/02/21 0841     Vitals:   Vitals:    06/03/21 0249   BP: 133/71   Pulse: 67   Resp: 17   Temp: 98 4 °F (36 9 °C)   SpO2: 94%       Intake/Output:  I/O       06/01 0701 - 06/02 0700 06/02 0701 - 06/03 0700    P  O  820 480    I V  (mL/kg) 2192 5 (26 9) 937 (11 5)    IV Piggyback 50     Total Intake(mL/kg) 3062 5 (37 5) 1417 (17 4)    Urine (mL/kg/hr) 3375 (1 7) 1580 (0 8)    Total Output 3375 1580    Net -312 5 -163                 Nutrition/GI Proph/Bowel Reg: Colace, senokot, protonix    Physical Exam:   General: NAD  HENT: NCAT MMM  Neck: supple, no JVD  CV: nl rate  Lungs: nl wob  No resp distress  ABD: Soft, nontender, nondistended  Extrem: Right lower extremity in dressing  Motor and sensory intact over the toes     Neuro: AAOx3      Invasive Devices     Peripheral Intravenous Line            Peripheral IV 06/02/21 Right;Upper Arm less than 1 day                 Lab Results:   BMP/CMP:   Lab Results   Component Value Date    SODIUM 138 06/03/2021    K 4 1 06/03/2021     06/03/2021    CO2 28 06/03/2021    BUN 12 06/03/2021    CREATININE 0 76 06/03/2021    CALCIUM 8 7 06/03/2021    EGFR 92 06/03/2021    and CBC:   Lab Results   Component Value Date    WBC 8 07 06/03/2021    HGB 13 2 06/03/2021    HCT 40 2 06/03/2021    MCV 96 06/03/2021     06/03/2021    MCH 31 5 06/03/2021    MCHC 32 8 06/03/2021    RDW 13 6 06/03/2021    MPV 9 4 06/03/2021     Imaging/EKG Studies: Results: I have personally reviewed pertinent films in PACS    VTE Prophylaxis: Enoxaparin (Lovenox)

## 2021-06-03 NOTE — ASSESSMENT & PLAN NOTE
- Fall from approximately 10 ft off of a roof with the below noted injuries  - Fall precautions   - PT and OT evaluation appreciated  Recommended for discharge home   -case management following

## 2021-06-03 NOTE — CASE MANAGEMENT
Pt is a Bundle  (LE and Humerus Procedure SNF LOS 15-18)  Pt is NOT A 30 Day Readmission    CM spoke to therapy  Pt is recommended for a home d/c with VNA, as well as a walker, BSC, and wheelchair  CM discussed this all with the pt's wife  She is open to VNA with no preference (CM placed referral)  Wife states they have access to the above DME and that they don't need the CM to place referrals for new DME  Pt's wife will transport home  Pt could potentially d/c home today  CM will follow for needs    CM reviewed d/c planning process including the following: identifying help at home, patient preference for d/c planning needs, Discharge Lounge, [facility] Meds to Bed program, availability of treatment team to discuss questions or concerns patient and/or family may have regarding understanding medications and recognizing signs and symptoms once discharged  CM also encouraged patient to follow up with all recommended appointments after discharge  Patient advised of importance for patient and family to participate in managing patients medical well being

## 2021-06-03 NOTE — INCIDENTAL FINDINGS
The following findings require follow up:  Radiographic finding   Finding:  Small pulmonary nodules as indicated above  Based on current Fleischner Society 2017 Guidelines on incidental pulmonary nodule, no routine follow-up is needed if the patient is considered low risk for lung cancer  If the patient is considered high   risk for lung cancer, 12 month follow-up non-contrast chest CT is recommended  Follow up required:     Follow up should be done within 2 week(s)    Please notify the following clinician to assist with the follow up:   Dr Nathan Phillips

## 2021-06-03 NOTE — PROGRESS NOTES
Pastoral Care Progress Note    6/3/2021  Patient: Areli Frey : 1950  Admission Date & Time: 2021 1006  MRN: [id number] CSN: [id number]    Pt identifies as Orthodoxy   updated [employer] info in Epic to reflect this  He has no needs at this time as he waits for his wife to pick him up for d/c      21 1100   Clinical Encounter Type   Visited With Patient   Routine Visit Introduction; Discharge         Chaplaincy Interventions Utilized:   Empowerment: Provided chaplaincy education    Exploration: Explored relational needs & resources and Explored spiritual needs & resources    Chaplaincy Outcomes Achieved:  Expressed gratitude

## 2021-06-03 NOTE — ASSESSMENT & PLAN NOTE
- Fall from approximately 10 ft off of a roof with the below noted injuries  - Fall precautions   - PT and OT evaluation appreciated    Recommended for discharge home   -case management following   - cleared for home

## 2021-06-03 NOTE — DISCHARGE INSTRUCTIONS
Leg Fracture   WHAT YOU NEED TO KNOW:   A leg fracture is a break in a bone in your leg  DISCHARGE INSTRUCTIONS:   Call your local emergency number (911 in the [address]) if:   · You suddenly feel lightheaded and short of breath  · You have chest pain when you take a deep breath or cough  · You cough up blood  Seek care immediately if:   · Your leg feels warm, tender, and painful  It may look swollen and red  · The pain in your leg gets worse even after you rest and take medicine  · Your cast gets wet or damaged  · Your leg or toes are numb  · Your leg becomes swollen, cold, or blue  Call your doctor or bone specialist if:   · You have a fever  · Your cast or brace is too tight  · You notice new blood stains or a bad smell coming from under the cast     · You have new or worsening trouble moving your leg  · You have questions or concerns about your condition or care  Medicines: You may need any of the following:  · Prescription pain medicine  may be given  Ask your healthcare provider how to take this medicine safely  Some prescription pain medicines contain acetaminophen  Do not take other medicines that contain acetaminophen without talking to your healthcare provider  Too much acetaminophen may cause liver damage  Prescription pain medicine may cause constipation  Ask your healthcare provider how to prevent or treat constipation  · NSAIDs , such as ibuprofen, help decrease swelling, pain, and fever  NSAIDs can cause stomach bleeding or kidney problems in certain people  If you take blood thinner medicine, always ask your healthcare provider if NSAIDs are safe for you  Always read the medicine label and follow directions  · Acetaminophen  decreases pain and fever  It is available without a doctor's order  Ask how much to take and how often to take it  Follow directions   Read the labels of all other medicines you are using to see if they also contain acetaminophen, or ask your doctor or pharmacist  Acetaminophen can cause liver damage if not taken correctly  Do not use more than 4 grams (4,000 milligrams) total of acetaminophen in one day  · Take your medicine as directed  Contact your healthcare provider if you think your medicine is not helping or if you have side effects  Tell him of her if you are allergic to any medicine  Keep a list of the medicines, vitamins, and herbs you take  Include the amounts, and when and why you take them  Bring the list or the pill bottles to follow-up visits  Carry your medicine list with you in case of an emergency  Cast or splint care:   · Ask when it is okay to take a bath or shower  Do not let your cast or splint get wet  Before you bathe, cover the cast or splint with a plastic bag  Tape the bag to your skin above the cast or splint to seal out water  Keep your leg out of the water in case the bag breaks  If a plaster cast gets wet and soft, call your healthcare provider  · Check the skin around the cast or brace every day  You may put lotion on any red or sore areas  · Do not  push down or lean on the cast or brace  · Do not  put a sharp or pointed object inside the cast to scratch an itch  Self-care:   · Rest  your leg as directed and avoid activities that cause leg pain  · Apply ice  on your leg for 15 to 20 minutes every hour or as directed  Use an ice pack, or put crushed ice in a plastic bag  Cover it with a towel before you apply it  Ice helps prevent tissue damage and decreases swelling and pain  · Elevate  your leg above the level of your heart as often as you can  This will help decrease swelling and pain  Prop your leg on pillows or blankets to keep it elevated comfortably  · Use crutches or a walker  as directed  Crutches will help you walk and take some weight off your leg while it heals  · Physical therapy  may be recommended   A physical therapist teaches you exercises to help improve movement and strength, and to decrease pain  Follow up with your doctor or bone specialist as directed: You may need to return to have your splint or cast removed  You may need an x-ray of your leg to check how well the bone has healed  Write down your questions so you remember to ask them during your visits  © Copyright [address] Information is for End User's use only and may not be sold, redistributed or otherwise used for commercial purposes  All illustrations and images included in CareNotes® are the copyrighted property of A D A M [employer]  or [address] [place], [place] [address]   The above information is an  only  It is not intended as medical advice for individual conditions or treatments  Talk to your doctor, nurse or pharmacist before following any medical regimen to see if it is safe and effective for you  Discharge Instructions - Orthopedics  Luis F Rivera 80 y o  male MRN: [id number]  Unit/Bed#: PACU 06    Weight Bearing Status:                                           Non-weight bearing right lower extremity     DVT prophylaxis  Lovenox    Pain:  Continue analgesics as directed    Dressing Instructions:   Please keep clean, dry and intact until follow up     Appt Instructions: If you do not have your appointment, please call the clinic at [phone] t  Otherwise followup as scheduled     Contact the office sooner if you experience any increased numbness/tingling in the extremities        Miscellaneous:  None

## 2021-06-03 NOTE — ASSESSMENT & PLAN NOTE
- Acute pain secondary to multiple traumatic injuries  - Continue multimodal analgesic regimen and adjust as indicated    - Continue bowel regimen-no bowel movements, but passing flatus

## 2021-06-03 NOTE — DISCHARGE SUMMARY
[facility]  Discharge- Juliane Stanley 1950, 80 y o  male MRN: [id number]  Unit/Bed#: [facility] 813-01 Encounter: [id number]  Primary Care Provider: No primary care provider on file  Date and time admitted to hospital: 5/31/2021 10:06 AM    Acute pain of right shoulder  Assessment & Plan  - Acute right shoulder pain following fall from height   - No evidence of acute osseous injury on x-ray  - Likely secondary to contusion   - Activity as tolerated with weight-bearing as tolerated on the right upper extremity  - Continue multimodal analgesic regimen   - PT and OT evaluation and treatment as indicated  Acute pain due to trauma  Assessment & Plan  - Acute pain secondary to multiple traumatic injuries  - Continue multimodal analgesic regimen and adjust as indicated  - Continue bowel regimen-no bowel movements, but passing flatus    Closed fracture of calcaneus  Assessment & Plan  - Mildly displaced comminuted right calcaneal fracture, present on admission   - Appreciate Orthopedic surgery evaluation and recommendations  Anticipate delayed operative intervention, likely on an elective basis secondary to swelling   - Maintain non-weightbearing status on the right lower extremity in bulky Alonso dressing   - Monitor right lower extremity neurovascular exam   - Continue multimodal analgesic regimen   - Continue DVT prophylaxis  - PT and OT evaluation and treatment as indicated  - Outpatient follow up with Orthopedic surgery for re-evaluation  Fall from height of greater than 3 feet  Assessment & Plan  - Fall from approximately 10 ft off of a roof with the below noted injuries  - Fall precautions   - PT and OT evaluation appreciated    Recommended for discharge home   -case management following   - cleared for home    * Tibia/fibula fracture, right, closed, initial encounter  Assessment & Plan  - Comminuted mildly displaced right distal tibial fracture with intra-articular extension, present on admission   - Patient is status post ORIF of the right distal tibial fracture on 06/01/2021  - Appreciate Orthopedic surgery evaluation, recommendations and intervention as noted  - Maintain non-weightbearing status on the right lower extremity in bulky Alonso dressing   - Monitor right lower extremity neurovascular exam   - Continue multimodal analgesic regimen   - Continue DVT prophylaxis for 28 days with lovenox   - PT and OT evaluation and treatment as indicated  - Outpatient follow up with Orthopedic surgery for re-evaluation  Resolved Problems  Date Reviewed: 6/3/2021    None          Admission Date:   Admission Orders (From admission, onward)     Ordered        05/31/21 1051  Inpatient Admission  Once                     Admitting Diagnosis: Trauma [T14 90XA]  Closed fracture of right ankle, initial encounter [S82 891A]  Closed displaced comminuted fracture of shaft of right tibia, initial encounter [S82 251A]  Closed displaced fracture of right calcaneus, unspecified portion of calcaneus, initial encounter [S92 001A]    HPI:  Aleja Nolen is a 80 y o  male who presents after a fall from 10 feet off a roof, No LOC but did strike head  Abrasions on rigth elbow, x-rays negative  Recalls the fall, pain in right lower extremity only  No complaints of chest pain or shorttness of breath  No c-spine, T-0spine, or L-spine tenderness  Relatively healthy individual     Procedures Performed:   Orders Placed This Encounter   Procedures    Fast Ultrasound       Summary of Hospital Course: 81 y/o male s/p fall from height resulting in Le fracture along with Calcaneal fracture  Able to be seen by Ortho and go to OR day 2 for fixation of tibia  Calcaneal fracture will continue to be observed for readiness of surgery in the near future  Pain controlled and ambulating with therapy  Incidential findings documented and recommended follow up with PCP  Will follow up with Ortho    For more details, please refer to medical records  Significant Findings, Care, Treatment and Services Provided: Xr Shoulder 2+ Vw Right    Result Date: 5/31/2021  Impression: No acute osseous abnormality  Workstation performed: [id number]     Xr Elbow 3+ Vw Right    Result Date: 5/31/2021  Impression: No acute osseous abnormality  Workstation performed: [id number]     Xr Knee 1 Or 2 Vw Right    Result Date: 5/31/2021  Impression: Limited study with probable proximal fibular fracture  Proximal tibial fracture not clearly identified but additional views suggested for more thorough evaluation  Workstation performed: [id number]     Xr Tibia Fibula 2 Vw Right    Result Date: 6/2/2021  Impression: Fluoroscopic guidance for plate and screw fixation acute distal tibial shaft fracture Workstation performed: [id number]     Xr Tibia Fibula 2 Vw Right    Result Date: 5/31/2021  Impression: Unchanged alignment of comminuted intra-articular distal tibial shaft fracture and comminuted calcaneal fracture  Workstation performed: [id number]     Xr Heel / Calcaneus 2+ Vw Right    Result Date: 5/31/2021  Impression: 1  Minimally displaced depressed comminuted calcaneal fracture  2   Distal tibial fracture  Workstation performed: [id number]     Trauma - Ct Head Wo Contrast    Result Date: 5/31/2021  Impression: No acute intracranial abnormality  I personally discussed this study with BEKAH Hudson  on 5/31/2021 at 10:40 AM  Workstation performed: [id number]     Trauma - Ct Spine Cervical Wo Contrast    Result Date: 5/31/2021  Impression: No cervical spine fracture or traumatic malalignment  I personally discussed this study with BEKAH Hudson  on 5/31/2021 at 10:40 AM   Workstation performed: [id number]     Trauma - Ct Chest Abdomen Pelvis W Contrast    Result Date: 5/31/2021  Impression: 1  No acute posttraumatic abnormality identified in the chest, abdomen, or pelvis   2   Small pulmonary nodules as indicated above  Based on current Fleischner Society 2017 Guidelines on incidental pulmonary nodule, no routine follow-up is needed if the patient is considered low risk for lung cancer  If the patient is considered high risk for lung cancer, 12 month follow-up non-contrast chest CT is recommended  3   Additional findings as noted above  I personally discussed this study with BEKAH Xavier  on 5/31/2021 at 11:00 AM  Workstation performed: [id number]     Xr Trauma Multiple (slb/slra Trauma [place] Only)    Addendum Date: 6/3/2021    ADDENDUM: In addition to the comminuted tibial fracture, there is also comminuted calcaneal fracture  Addendum Date: 5/31/2021    ADDENDUM: In addition to the comminuted tibial fracture, there is also comminuted calcaneal fracture  Result Date: 6/3/2021  Impression: No acute cardiopulmonary disease within limitations of supine imaging  Comminuted and mildly displaced distal tibial shaft fracture  Workstation performed: [id number]     Ct Lower Extremity Wo Contrast Right    Result Date: 5/31/2021  Impression: 1  Comminuted mildly displaced distal right tibial fracture as noted with intra-articular extension  2   Mildly displaced comminuted calcaneal fracture as noted  Workstation performed: [id number]       Complications: none    Condition at Discharge: stable         Discharge instructions/Information to patient and family:   See after visit summary for information provided to patient and family  Provisions for Follow-Up Care:  See after visit summary for information related to follow-up care and any pertinent home health orders  PCP: No primary care provider on file  Disposition: Home    Planned Readmission: No    Discharge Statement   I spent 30 minutes discharging the patient  This time was spent on the day of discharge  I had direct contact with the patient on the day of discharge   Additional documentation is required if more than 30 minutes were spent on discharge  Discharge Medications:  See after visit summary for reconciled discharge medications provided to patient and family

## 2021-06-04 ENCOUNTER — TELEPHONE (OUTPATIENT)
Dept: OBGYN CLINIC | Facility: HOSPITAL | Age: 71
End: 2021-06-04

## 2021-06-04 NOTE — TELEPHONE ENCOUNTER
Tried calling pt to schedule his first post op appt for 6/17 with Lorenza LEBRON)  L/SEYMOUR for pt to call back

## 2021-06-05 ENCOUNTER — TELEPHONE (OUTPATIENT)
Dept: OTHER | Facility: OTHER | Age: 71
End: 2021-06-05

## 2021-06-05 NOTE — TELEPHONE ENCOUNTER
Patient's wife called to ask if the Bandage can stay on until next Appointment on June 17, or should someone look at it sooner  Please f/u with Patient on Monday   Thank You

## 2021-06-07 ENCOUNTER — PATIENT OUTREACH (OUTPATIENT)
Dept: CASE MANAGEMENT | Facility: OTHER | Age: 71
End: 2021-06-07

## 2021-06-07 NOTE — TELEPHONE ENCOUNTER
Spoke with patient and advised to stay in Bulky Alonso dressing until follow-up   He is aware, No further action needed

## 2021-06-17 ENCOUNTER — HOSPITAL ENCOUNTER (OUTPATIENT)
Dept: RADIOLOGY | Facility: HOSPITAL | Age: 71
Discharge: HOME | End: 2021-06-17

## 2021-06-17 ENCOUNTER — OFFICE VISIT (OUTPATIENT)
Dept: OBGYN CLINIC | Facility: HOSPITAL | Age: 71
End: 2021-06-17

## 2021-06-17 VITALS
HEART RATE: 82 BPM | HEIGHT: 71 IN | WEIGHT: 180 LBS | DIASTOLIC BLOOD PRESSURE: 68 MMHG | SYSTOLIC BLOOD PRESSURE: 116 MMHG | BODY MASS INDEX: 25.2 KG/M2

## 2021-06-17 DIAGNOSIS — S82.891A CLOSED FRACTURE OF RIGHT ANKLE, INITIAL ENCOUNTER: ICD-10-CM

## 2021-06-17 DIAGNOSIS — T14.8XXA FRACTURE: ICD-10-CM

## 2021-06-17 DIAGNOSIS — Z48.89 AFTERCARE FOLLOWING SURGERY: ICD-10-CM

## 2021-06-17 DIAGNOSIS — S82.201A TIBIA/FIBULA FRACTURE, RIGHT, CLOSED, INITIAL ENCOUNTER: Primary | ICD-10-CM

## 2021-06-17 DIAGNOSIS — S82.251A CLOSED DISPLACED COMMINUTED FRACTURE OF SHAFT OF RIGHT TIBIA, INITIAL ENCOUNTER: ICD-10-CM

## 2021-06-17 DIAGNOSIS — S82.401A TIBIA/FIBULA FRACTURE, RIGHT, CLOSED, INITIAL ENCOUNTER: Primary | ICD-10-CM

## 2021-06-17 PROCEDURE — 73650 X-RAY EXAM OF HEEL: CPT

## 2021-06-17 PROCEDURE — 99024 POSTOP FOLLOW-UP VISIT: CPT | Performed by: PHYSICIAN ASSISTANT

## 2021-06-17 PROCEDURE — 73590 X-RAY EXAM OF LOWER LEG: CPT

## 2021-06-17 RX ORDER — METHOCARBAMOL 750 MG/1
750 TABLET, FILM COATED ORAL EVERY 6 HOURS PRN
Qty: 30 TABLET | Refills: 0 | Status: ON HOLD | OUTPATIENT
Start: 2021-06-17 | End: 2021-07-02 | Stop reason: SDUPTHER

## 2021-06-17 RX ORDER — OXYCODONE HYDROCHLORIDE 5 MG/1
5 TABLET ORAL EVERY 6 HOURS PRN
Qty: 30 TABLET | Refills: 0 | Status: SHIPPED | OUTPATIENT
Start: 2021-06-17 | End: 2021-07-02 | Stop reason: HOSPADM

## 2021-06-17 NOTE — PROGRESS NOTES
Assessment:    2 weeks s/p ORIF right tibia done 6/1/2021  Incisions healing nicely, staples and sutures removed, steri-strips applied  X-rays stable     Plan:    NWB RLE in CAM boot  Ankle ROM exercises encouraged  Elevation for swelling   Continue Lovenox  Pain medications refilled  Eventual outpatient therapy is recommended, patient wishes to hold off for now  F/U Dr Ramses Payan for right calcaneus fracture   Can follow-up with us 4 weeks for right tibia, repeat x-rays        Problem List Items Addressed This Visit        Musculoskeletal and Integument    Tibia/fibula fracture, right, closed, initial encounter - Primary                   Subjective:     Patient ID:  Ad Mina is a 80 y o  male    HPI    2 weeks s/p ORIF right tibia done 6/1/2021  Today the patient states overall he is doing well  He gets pain in the right lower extremity that is mostly controlled with oxycodone as needed  He denies any severe persistent pain  No numbness and tingling in the right lower extremity  No issues with his dressings  No fever chills  He has been elevating above the heart  He denies any acute issues at today's visit  Also has right calcaneous fracture treated non-op with Bulky Alonso dressing  The following portions of the patient's history were reviewed and updated as appropriate: allergies, current medications, past family history, past medical history, past social history, past surgical history and problem list     Review of Systems     Objective:    Imaging:  Right tibia x-rays performed today demonstrate visible fracture lines with plate and screw construct intact, unchanged alignment  Right calcaneus fracture is demonstrated      Vitals:    06/17/21 0835   BP: 116/68   Pulse: 82           Physical Exam     Right lower extremity  All incisions are clean dry intact with no erythema drainage or wound dehiscence      Right lower leg is appropriately swollen  + ankle DF/PF, EHL/FHL  Sensation intact  Toes warm, sensate, mobile  Good cap refill       Suture removal    Date/Time: 6/17/2021 8:57 AM  Performed by: Chandrakant Bryant PA-C  Authorized by: Chandrakant Bryant PA-C   [facility] Protocol:  Consent: Verbal consent obtained  Consent given by: patient        Patient location:  Clinic  Location:     Laterality:  Right    Location:  Lower extremity    Lower extremity location:  Leg    Leg location:  R lower leg  Procedure details: Tools used:  Suture removal kit    Wound appearance:  No sign(s) of infection, good wound healing and clean  Post-procedure details:     Post-removal:  Steri-Strips applied    Patient tolerance of procedure:   Tolerated well, no immediate complications

## 2021-06-21 ENCOUNTER — PATIENT OUTREACH (OUTPATIENT)
Dept: CASE MANAGEMENT | Facility: OTHER | Age: 71
End: 2021-06-21

## 2021-06-21 NOTE — PROGRESS NOTES
Outreach TC to patient for Cm assessment  Patient reports that he is feeling well  Patient denies any uncontrolled pain  Patient saw orthopedics on 6/17 related to his tib/fib fracture  Patient had all sutures removed  Patient reports that he has no drainage noted from incision site  Patient will follow up with Dr Dev Henderson in orthopedics related to R calcaneal fx  Patient remains NWB in a CAM boot  Reviewed home medications, s/sx to report, future appointments and how/when to report symptoms to provider vs 911  Patient verbalizes understanding and agres to additional calls

## 2021-06-24 ENCOUNTER — OFFICE VISIT (OUTPATIENT)
Dept: OBGYN CLINIC | Facility: HOSPITAL | Age: 71
End: 2021-06-24
Payer: MEDICARE

## 2021-06-24 VITALS
SYSTOLIC BLOOD PRESSURE: 109 MMHG | WEIGHT: 180 LBS | HEART RATE: 87 BPM | BODY MASS INDEX: 25.2 KG/M2 | DIASTOLIC BLOOD PRESSURE: 69 MMHG | HEIGHT: 71 IN

## 2021-06-24 DIAGNOSIS — S82.201A TIBIA/FIBULA FRACTURE, RIGHT, CLOSED, INITIAL ENCOUNTER: ICD-10-CM

## 2021-06-24 DIAGNOSIS — S82.401A TIBIA/FIBULA FRACTURE, RIGHT, CLOSED, INITIAL ENCOUNTER: ICD-10-CM

## 2021-06-24 DIAGNOSIS — S82.251A CLOSED DISPLACED COMMINUTED FRACTURE OF SHAFT OF RIGHT TIBIA, INITIAL ENCOUNTER: ICD-10-CM

## 2021-06-24 DIAGNOSIS — S92.011A DISPLACED FRACTURE OF BODY OF RIGHT CALCANEUS, INITIAL ENCOUNTER FOR CLOSED FRACTURE: Primary | ICD-10-CM

## 2021-06-24 PROCEDURE — 99214 OFFICE O/P EST MOD 30 MIN: CPT | Performed by: ORTHOPAEDIC SURGERY

## 2021-06-24 RX ORDER — CEFAZOLIN SODIUM 2 G/50ML
2000 SOLUTION INTRAVENOUS ONCE
Status: CANCELLED | OUTPATIENT
Start: 2021-06-24 | End: 2021-06-24

## 2021-06-24 NOTE — PROGRESS NOTES
Orthopaedics Office Visit - 1st Patient Visit    ASSESSMENT/PLAN:    Assessment:   3 weeks status post ORIF right tibia  DOS 6/1/21  Comminuted  Right calcaneus fracture    Calcaneus fracture with significant comminution of subtalar joint, shortening of calcaneal length, loss of height/Bohler's angle, indicated for fixation, plan for extensile lateral approach - patient is a nondiabetic, nonsmoker    Plan:   - Discussed conservative and surgical intervention with patient at length  Patient opted for surgical intervention at this time   - Risks and benefits were discussed with patient at length  Procedure being performed: ORIF right calcaneus and all other associated procedures  informed consent was obtained at this time  - Over the counter analgesics as needed / directed   - Ice / heat as directed   - Hold anticoagulation night before surgery   - Continue with NWB status right LE   - Follow up post op      To Do Next Visit:  Sutures out, XR right leg     _____________________________________________________  CHIEF COMPLAINT:  Chief Complaint   Patient presents with    Right Foot - Pain         SUBJECTIVE:  Colletta Bacca is a 80 y o  male who presents  To the office for a follow-up evaluation of his right leg  Patient is 3 weeks status post ORIF right tibia  DOS 6/1/21    Closed treatment of a comminuted calcaneus fracture on right leg  Patient states  That the leg is doing well overall  Patient states he has very minimal pain in the lower leg and in the calcaneus region  Patient states he has been taking oxycodone as needed for pain  Patient denies any numbness tingling in the foot  Patient denies any numbness tingling leg  Patient states he has been compliant with anticoagulation therapy  Patient offers no other complaints this time  PAST MEDICAL HISTORY:  History reviewed  No pertinent past medical history      PAST SURGICAL HISTORY:  Past Surgical History:   Procedure Laterality Date    ORIF TIBIA FRACTURE Right 6/1/2021    Procedure: OPEN REDUCTION W/ INTERNAL FIXATION (ORIF) TIBIA;  Surgeon: Sydney Odonnell MD;  Location: [facility];  Service: Orthopedics       FAMILY HISTORY:  History reviewed  No pertinent family history  SOCIAL HISTORY:  Social History     Tobacco Use    Smoking status: Never Smoker    Smokeless tobacco: Never Used   Vaping Use    Vaping Use: Never used   Substance Use Topics    Alcohol use: Never    Drug use: Never       MEDICATIONS:    Current Outpatient Medications:     acetaminophen (TYLENOL) 325 mg tablet, Take 3 tablets (975 mg total) by mouth every 8 (eight) hours, Disp: 30 tablet, Rfl: 0    enoxaparin (LOVENOX) 40 mg/0 4 mL, Inject 0 4 mL (40 mg total) under the skin daily for 28 days, Disp: 11 2 mL, Rfl: 0    methocarbamol (ROBAXIN) 750 mg tablet, Take 1 tablet (750 mg total) by mouth every 6 (six) hours as needed for muscle spasms, Disp: 30 tablet, Rfl: 0    omeprazole (PriLOSEC) 20 mg delayed release capsule, Take 20 mg by mouth daily, Disp: , Rfl:     oxyCODONE (ROXICODONE) 5 mg immediate release tablet, Take 1 tablet (5 mg total) by mouth every 6 (six) hours as needed for severe painMax Daily Amount: 20 mg, Disp: 30 tablet, Rfl: 0    senna (SENOKOT) 8 6 mg, Take 2 tablets (17 2 mg total) by mouth daily, Disp: 10 tablet, Rfl: 0    ALLERGIES:  No Known Allergies    REVIEW OF SYSTEMS:  MSK: Right leg pain   Neuro: WNL   Pertinent items are otherwise noted in HPI  A comprehensive review of systems was otherwise negative      LABS:  HgA1c: No results found for: HGBA1C  BMP:   Lab Results   Component Value Date    GLUCOSE 152 (H) 05/31/2021    CALCIUM 8 7 06/03/2021    K 4 1 06/03/2021    CO2 28 06/03/2021     06/03/2021    BUN 12 06/03/2021    CREATININE 0 76 06/03/2021     CBC: No components found for: CBC    _____________________________________________________  PHYSICAL EXAMINATION:  Vital signs: /69   Pulse 87   Ht 5' 11" (1 803 m)   [facility] 81 6 kg (180 lb)   BMI 25 10 kg/m²   General: No acute distress, awake and alert  Psychiatric: Mood and affect appear appropriate  HEENT: Trachea Midline, No torticollis, no apparent facial trauma  Cardiovascular: No audible murmurs; Extremities appear perfused  Pulmonary: No audible wheezing or stridor  Skin: No open lesions; see further details (if any) below      MUSCULOSKELETAL EXAMINATION:   right lower leg  Examination:  - Patient sitting comfortably in the office in no acute distress   -   Diffuse swelling noted throughout the lower extremity  Incision sites healed with no surrounding erythema or ecchymosis  Positive wrinkle sign over lateral calcaneus   -   Tenderness palpation noted over the incision site  Tenderness palpation noted over medial lateral calcaneus  No other bony or soft tissue tenderness palpation noted at this time  -   Patient actively able to get foot to neutral with 30° of plantar flexion present   - NV intact    _____________________________________________________  STUDIES REVIEWED:  I personally reviewed the images and interpretation is as follows:  CT lower extremity wo contrast right  comminuted mildly depressed fracture involving the calcaneal body extending to the anterior process with reduction of Boehler's angle       PROCEDURES PERFORMED:  No procedures were performed at this time         Álvaro Landis PA-C - assisting    Christina Vargas MD

## 2021-06-24 NOTE — H&P (VIEW-ONLY)
Orthopaedics Office Visit - 1st Patient Visit    ASSESSMENT/PLAN:    Assessment:   3 weeks status post ORIF right tibia  DOS 6/1/21  Comminuted  Right calcaneus fracture    Calcaneus fracture with significant comminution of subtalar joint, shortening of calcaneal length, loss of height/Bohler's angle, indicated for fixation, plan for extensile lateral approach - patient is a nondiabetic, nonsmoker    Plan:   - Discussed conservative and surgical intervention with patient at length  Patient opted for surgical intervention at this time   - Risks and benefits were discussed with patient at length  Procedure being performed: ORIF right calcaneus and all other associated procedures  informed consent was obtained at this time  - Over the counter analgesics as needed / directed   - Ice / heat as directed   - Hold anticoagulation night before surgery   - Continue with NWB status right LE   - Follow up post op      To Do Next Visit:  Sutures out, XR right leg     _____________________________________________________  CHIEF COMPLAINT:  Chief Complaint   Patient presents with    Right Foot - Pain         SUBJECTIVE:  Magen Young is a 80 y o  male who presents  To the office for a follow-up evaluation of his right leg  Patient is 3 weeks status post ORIF right tibia  DOS 6/1/21    Closed treatment of a comminuted calcaneus fracture on right leg  Patient states  That the leg is doing well overall  Patient states he has very minimal pain in the lower leg and in the calcaneus region  Patient states he has been taking oxycodone as needed for pain  Patient denies any numbness tingling in the foot  Patient denies any numbness tingling leg  Patient states he has been compliant with anticoagulation therapy  Patient offers no other complaints this time  PAST MEDICAL HISTORY:  History reviewed  No pertinent past medical history      PAST SURGICAL HISTORY:  Past Surgical History:   Procedure Laterality Date    ORIF TIBIA FRACTURE Right 6/1/2021    Procedure: OPEN REDUCTION W/ INTERNAL FIXATION (ORIF) TIBIA;  Surgeon: [place] Buckner MD;  [facility];  Service: Orthopedics       FAMILY HISTORY:  History reviewed  No pertinent family history  SOCIAL HISTORY:  Social History     Tobacco Use    Smoking status: Never Smoker    Smokeless tobacco: Never Used   Vaping Use    Vaping Use: Never used   Substance Use Topics    Alcohol use: Never    Drug use: Never       MEDICATIONS:    Current Outpatient Medications:     acetaminophen (TYLENOL) 325 mg tablet, Take 3 tablets (975 mg total) by mouth every 8 (eight) hours, Disp: 30 tablet, Rfl: 0    enoxaparin (LOVENOX) 40 mg/0 4 mL, Inject 0 4 mL (40 mg total) under the skin daily for 28 days, Disp: 11 2 mL, Rfl: 0    methocarbamol (ROBAXIN) 750 mg tablet, Take 1 tablet (750 mg total) by mouth every 6 (six) hours as needed for muscle spasms, Disp: 30 tablet, Rfl: 0    omeprazole (PriLOSEC) 20 mg delayed release capsule, Take 20 mg by mouth daily, Disp: , Rfl:     oxyCODONE (ROXICODONE) 5 mg immediate release tablet, Take 1 tablet (5 mg total) by mouth every 6 (six) hours as needed for severe painMax Daily Amount: 20 mg, Disp: 30 tablet, Rfl: 0    senna (SENOKOT) 8 6 mg, Take 2 tablets (17 2 mg total) by mouth daily, Disp: 10 tablet, Rfl: 0    ALLERGIES:  No Known Allergies    REVIEW OF SYSTEMS:  MSK: Right leg pain   Neuro: WNL   Pertinent items are otherwise noted in HPI  A comprehensive review of systems was otherwise negative      LABS:  HgA1c: No results found for: HGBA1C  BMP:   Lab Results   Component Value Date    GLUCOSE 152 (H) 05/31/2021    CALCIUM 8 7 06/03/2021    K 4 1 06/03/2021    CO2 28 06/03/2021     06/03/2021    BUN 12 06/03/2021    CREATININE 0 76 06/03/2021     CBC: No components found for: CBC    _____________________________________________________  PHYSICAL EXAMINATION:  Vital signs: /69   Pulse 87   Ht 5' 11" (1 803 m)   [facility] 81 6 kg (180 lb)   BMI 25 10 kg/m²   General: No acute distress, awake and alert  Psychiatric: Mood and affect appear appropriate  HEENT: Trachea Midline, No torticollis, no apparent facial trauma  Cardiovascular: No audible murmurs; Extremities appear perfused  Pulmonary: No audible wheezing or stridor  Skin: No open lesions; see further details (if any) below      MUSCULOSKELETAL EXAMINATION:   right lower leg  Examination:  - Patient sitting comfortably in the office in no acute distress   -   Diffuse swelling noted throughout the lower extremity  Incision sites healed with no surrounding erythema or ecchymosis  Positive wrinkle sign over lateral calcaneus   -   Tenderness palpation noted over the incision site  Tenderness palpation noted over medial lateral calcaneus  No other bony or soft tissue tenderness palpation noted at this time  -   Patient actively able to get foot to neutral with 30° of plantar flexion present   - NV intact    _____________________________________________________  STUDIES REVIEWED:  I personally reviewed the images and interpretation is as follows:  CT lower extremity wo contrast right  comminuted mildly depressed fracture involving the calcaneal body extending to the anterior process with reduction of Boehler's angle       PROCEDURES PERFORMED:  No procedures were performed at this time         Leonardo Plascencia PA-C - assisting    Ronald Chakraborty MD

## 2021-06-25 PROBLEM — S92.011A DISPLACED FRACTURE OF BODY OF RIGHT CALCANEUS, INITIAL ENCOUNTER FOR CLOSED FRACTURE: Status: ACTIVE | Noted: 2021-06-01

## 2021-06-30 ENCOUNTER — ANESTHESIA EVENT (OUTPATIENT)
Dept: PERIOP | Facility: HOSPITAL | Age: 71
End: 2021-06-30
Payer: MEDICARE

## 2021-06-30 RX ORDER — ONDANSETRON 2 MG/ML
4 INJECTION INTRAMUSCULAR; INTRAVENOUS ONCE AS NEEDED
Status: CANCELLED | OUTPATIENT
Start: 2021-06-30

## 2021-06-30 RX ORDER — FENTANYL CITRATE/PF 50 MCG/ML
25 SYRINGE (ML) INJECTION
Status: CANCELLED | OUTPATIENT
Start: 2021-06-30

## 2021-06-30 RX ORDER — SODIUM CHLORIDE, SODIUM LACTATE, POTASSIUM CHLORIDE, CALCIUM CHLORIDE 600; 310; 30; 20 MG/100ML; MG/100ML; MG/100ML; MG/100ML
75 INJECTION, SOLUTION INTRAVENOUS CONTINUOUS
Status: CANCELLED | OUTPATIENT
Start: 2021-06-30

## 2021-06-30 RX ORDER — ACETAMINOPHEN 325 MG/1
975 TABLET ORAL ONCE
Status: CANCELLED | OUTPATIENT
Start: 2021-06-30 | End: 2021-06-30

## 2021-06-30 RX ORDER — HYDROMORPHONE HCL/PF 1 MG/ML
0.25 SYRINGE (ML) INJECTION
Status: CANCELLED | OUTPATIENT
Start: 2021-06-30

## 2021-06-30 NOTE — PRE-PROCEDURE INSTRUCTIONS
Pre-Surgery Instructions:   Medication Instructions    enoxaparin (LOVENOX) 40 mg/0 4 mL Instructed patient per Anesthesia Guidelines  last dose 6/29 pm  HOLD 6/30    methocarbamol (ROBAXIN) 750 mg tablet Instructed patient per Anesthesia Guidelines  may use    omeprazole (PriLOSEC) 20 mg delayed release capsule Instructed patient per Anesthesia Guidelines  pm useage    oxyCODONE (ROXICODONE) 5 mg immediate release tablet Instructed patient per Anesthesia Guidelines  pm useage    senna (SENOKOT) 8 6 mg Instructed patient per Anesthesia Guidelines  prn   Pre procedure instructions reviewed verbalizes understanding  NPO after MN  Bathing reviewed    No NSAIDS  HOLD Lovenox 6/30pm usage  Ulysses Benavidez

## 2021-06-30 NOTE — ANESTHESIA PREPROCEDURE EVALUATION
Procedure:  OPEN REDUCTION W/ INTERNAL FIXATION (ORIF) RIGHT CALCANEUS FRACTURE (Right Foot)    Relevant Problems   ANESTHESIA (within normal limits)   (-) History of anesthesia complications      CARDIO (within normal limits)      ENDO (within normal limits)      GI/HEPATIC   (+) Gastroesophageal reflux disease      /RENAL (within normal limits)      HEMATOLOGY (within normal limits)      NEURO/PSYCH (within normal limits)      PULMONARY (within normal limits)      Other   (+) Displaced fracture of body of right calcaneus, initial encounter for closed fracture        Physical Exam    Airway    Mallampati score: II  TM Distance: >3 FB  Neck ROM: full     Dental   No notable dental hx     Cardiovascular  Rhythm: regular, Rate: normal,     Pulmonary  Pulmonary exam normal Breath sounds clear to auscultation,     Other Findings        Anesthesia Plan  ASA Score- 2     Anesthesia Type- general with ASA Monitors  Additional Monitors:   Airway Plan: ETT  Plan Factors-Exercise tolerance (METS): >4 METS  Chart reviewed  EKG reviewed  Imaging results reviewed  Existing labs reviewed  Patient summary reviewed  Patient is not a current smoker  Patient did not smoke on day of surgery  Induction- intravenous  Postoperative Plan-   Planned trial extubation    Informed Consent- Anesthetic plan and risks discussed with patient and spouse  I personally reviewed this patient with the CRNA  Discussed and agreed on the Anesthesia Plan with the CRNA           NPO verified  NKDA  Plan:  GETA    Risks and benefits of general anesthesia were discussed with the patient  Discussed risks of anesthesia including, but not limited to, the risk of dental injury, n/v, sore throat, corneal abrasions, and arrhythmias  All questions were answered  Anesthesia consent was obtained from the patient

## 2021-07-01 ENCOUNTER — ANESTHESIA (OUTPATIENT)
Dept: PERIOP | Facility: HOSPITAL | Age: 71
End: 2021-07-01
Payer: MEDICARE

## 2021-07-01 ENCOUNTER — PATIENT OUTREACH (OUTPATIENT)
Dept: CASE MANAGEMENT | Facility: OTHER | Age: 71
End: 2021-07-01

## 2021-07-01 ENCOUNTER — HOSPITAL ENCOUNTER (OUTPATIENT)
Facility: HOSPITAL | Age: 71
Setting detail: OUTPATIENT SURGERY
Discharge: HOME | End: 2021-07-02
Attending: ORTHOPAEDIC SURGERY | Admitting: ORTHOPAEDIC SURGERY
Payer: MEDICARE

## 2021-07-01 ENCOUNTER — HOSPITAL ENCOUNTER (OUTPATIENT)
Dept: RADIOLOGY | Facility: HOSPITAL | Age: 71
Setting detail: OUTPATIENT SURGERY
Discharge: HOME | End: 2021-07-01
Payer: MEDICARE

## 2021-07-01 DIAGNOSIS — Z98.890 STATUS POST OPEN REDUCTION AND INTERNAL FIXATION (ORIF) OF FRACTURE: ICD-10-CM

## 2021-07-01 DIAGNOSIS — Z87.81 STATUS POST OPEN REDUCTION AND INTERNAL FIXATION (ORIF) OF FRACTURE: ICD-10-CM

## 2021-07-01 DIAGNOSIS — Z98.890 S/P ORIF (OPEN REDUCTION INTERNAL FIXATION) FRACTURE: Primary | ICD-10-CM

## 2021-07-01 DIAGNOSIS — S82.251A CLOSED DISPLACED COMMINUTED FRACTURE OF SHAFT OF RIGHT TIBIA, INITIAL ENCOUNTER: ICD-10-CM

## 2021-07-01 DIAGNOSIS — S82.891A CLOSED FRACTURE OF RIGHT ANKLE, INITIAL ENCOUNTER: ICD-10-CM

## 2021-07-01 DIAGNOSIS — Z87.81 S/P ORIF (OPEN REDUCTION INTERNAL FIXATION) FRACTURE: Primary | ICD-10-CM

## 2021-07-01 DIAGNOSIS — S92.011A DISPLACED FRACTURE OF BODY OF RIGHT CALCANEUS, INITIAL ENCOUNTER FOR CLOSED FRACTURE: ICD-10-CM

## 2021-07-01 PROCEDURE — C1713 ANCHOR/SCREW BN/BN,TIS/BN: HCPCS | Performed by: ORTHOPAEDIC SURGERY

## 2021-07-01 PROCEDURE — 73650 X-RAY EXAM OF HEEL: CPT

## 2021-07-01 PROCEDURE — 28415 OPTX CALCANEAL FRACTURE: CPT | Performed by: ORTHOPAEDIC SURGERY

## 2021-07-01 PROCEDURE — C1769 GUIDE WIRE: HCPCS | Performed by: ORTHOPAEDIC SURGERY

## 2021-07-01 PROCEDURE — C9290 INJ, BUPIVACAINE LIPOSOME: HCPCS | Performed by: STUDENT IN AN ORGANIZED HEALTH CARE EDUCATION/TRAINING PROGRAM

## 2021-07-01 PROCEDURE — G9197 ORDER FOR CEPH: HCPCS | Performed by: ORTHOPAEDIC SURGERY

## 2021-07-01 DEVICE — 2.7MM VA LCKNG SCREW SLF-TPNG WITH T8 STARDRIVE RECESS 32MM: Type: IMPLANTABLE DEVICE | Site: HEEL | Status: FUNCTIONAL

## 2021-07-01 DEVICE — 2.7MM VA LCKNG SCREW SLF-TPNG WITH T8 STARDRIVE RECESS 28MM: Type: IMPLANTABLE DEVICE | Site: HEEL | Status: FUNCTIONAL

## 2021-07-01 DEVICE — 2.7MM VA-LOCKING CALCANEAL PLATE MEDIUM 64MM LEFT: Type: IMPLANTABLE DEVICE | Site: HEEL | Status: FUNCTIONAL

## 2021-07-01 DEVICE — 2.7MM VA LCKNG SCREW SLF-TPNG WITH T8 STARDRIVE RECESS 40MM: Type: IMPLANTABLE DEVICE | Site: HEEL | Status: FUNCTIONAL

## 2021-07-01 DEVICE — 4.5MM CANNULATED SCREW FULLY THREADED/72MM: Type: IMPLANTABLE DEVICE | Site: HEEL | Status: FUNCTIONAL

## 2021-07-01 DEVICE — 2.7MM CORTEX SCREW SLF-TPNG WITH T8 STARDRIVE RECESS/44MM: Type: IMPLANTABLE DEVICE | Site: HEEL | Status: FUNCTIONAL

## 2021-07-01 DEVICE — GRAFT BONE CANCELLOUS CHIPS 30ML: Type: IMPLANTABLE DEVICE | Site: HEEL | Status: FUNCTIONAL

## 2021-07-01 DEVICE — 2.7MM CORTEX SCREW SLF-TPNG WITH T8 STARDRIVE RECESS/46MM: Type: IMPLANTABLE DEVICE | Site: HEEL | Status: FUNCTIONAL

## 2021-07-01 DEVICE — 2.7MM VA LCKNG SCREW SLF-TPNG WITH T8 STARDRIVE RECESS 34MM: Type: IMPLANTABLE DEVICE | Site: HEEL | Status: FUNCTIONAL

## 2021-07-01 DEVICE — 2.7MM VA LCKNG SCREW SLF-TPNG WITH T8 STARDRIVE RECESS 30MM: Type: IMPLANTABLE DEVICE | Site: HEEL | Status: FUNCTIONAL

## 2021-07-01 DEVICE — 2.7MM CORTEX SCREW SLF-TPNG WITH T8 STARDRIVE RECESS 38MM: Type: IMPLANTABLE DEVICE | Site: HEEL | Status: FUNCTIONAL

## 2021-07-01 DEVICE — 2.7MM CORTEX SCREW SLF-TPNG WITH T8 STARDRIVE RECESS 34MM: Type: IMPLANTABLE DEVICE | Site: HEEL | Status: FUNCTIONAL

## 2021-07-01 DEVICE — 2.7MM VA LCKNG SCREW SLF-TPNG WITH T8 STARDRIVE RECESS 42MM: Type: IMPLANTABLE DEVICE | Site: HEEL | Status: FUNCTIONAL

## 2021-07-01 RX ORDER — FENTANYL CITRATE/PF 50 MCG/ML
25 SYRINGE (ML) INJECTION
Status: DISCONTINUED | OUTPATIENT
Start: 2021-07-01 | End: 2021-07-01

## 2021-07-01 RX ORDER — ROCURONIUM BROMIDE 10 MG/ML
INJECTION, SOLUTION INTRAVENOUS AS NEEDED
Status: DISCONTINUED | OUTPATIENT
Start: 2021-07-01 | End: 2021-07-01

## 2021-07-01 RX ORDER — CEFAZOLIN SODIUM 2 G/50ML
2000 SOLUTION INTRAVENOUS ONCE
Status: DISCONTINUED | OUTPATIENT
Start: 2021-07-01 | End: 2021-07-01

## 2021-07-01 RX ORDER — GABAPENTIN 100 MG/1
100 CAPSULE ORAL 3 TIMES DAILY
Status: DISCONTINUED | OUTPATIENT
Start: 2021-07-01 | End: 2021-07-02 | Stop reason: HOSPADM

## 2021-07-01 RX ORDER — FENTANYL CITRATE 50 UG/ML
INJECTION, SOLUTION INTRAMUSCULAR; INTRAVENOUS AS NEEDED
Status: DISCONTINUED | OUTPATIENT
Start: 2021-07-01 | End: 2021-07-01

## 2021-07-01 RX ORDER — HYDROMORPHONE HCL/PF 1 MG/ML
0.25 SYRINGE (ML) INJECTION
Status: CANCELLED | OUTPATIENT
Start: 2021-07-01

## 2021-07-01 RX ORDER — ALBUMIN, HUMAN INJ 5% 5 %
SOLUTION INTRAVENOUS CONTINUOUS PRN
Status: DISCONTINUED | OUTPATIENT
Start: 2021-07-01 | End: 2021-07-01

## 2021-07-01 RX ORDER — OXYCODONE HYDROCHLORIDE 5 MG/1
5 TABLET ORAL EVERY 4 HOURS PRN
Status: DISCONTINUED | OUTPATIENT
Start: 2021-07-01 | End: 2021-07-02 | Stop reason: HOSPADM

## 2021-07-01 RX ORDER — ONDANSETRON 2 MG/ML
4 INJECTION INTRAMUSCULAR; INTRAVENOUS ONCE AS NEEDED
Status: DISCONTINUED | OUTPATIENT
Start: 2021-07-01 | End: 2021-07-01

## 2021-07-01 RX ORDER — EPHEDRINE SULFATE 50 MG/ML
INJECTION INTRAVENOUS AS NEEDED
Status: DISCONTINUED | OUTPATIENT
Start: 2021-07-01 | End: 2021-07-01

## 2021-07-01 RX ORDER — FENTANYL CITRATE/PF 50 MCG/ML
25 SYRINGE (ML) INJECTION
Status: CANCELLED | OUTPATIENT
Start: 2021-07-01

## 2021-07-01 RX ORDER — LIDOCAINE HYDROCHLORIDE 10 MG/ML
0.5 INJECTION, SOLUTION EPIDURAL; INFILTRATION; INTRACAUDAL; PERINEURAL ONCE AS NEEDED
Status: DISCONTINUED | OUTPATIENT
Start: 2021-07-01 | End: 2021-07-01

## 2021-07-01 RX ORDER — ONDANSETRON 2 MG/ML
4 INJECTION INTRAMUSCULAR; INTRAVENOUS EVERY 6 HOURS PRN
Status: DISCONTINUED | OUTPATIENT
Start: 2021-07-01 | End: 2021-07-02 | Stop reason: HOSPADM

## 2021-07-01 RX ORDER — ONDANSETRON 2 MG/ML
INJECTION INTRAMUSCULAR; INTRAVENOUS AS NEEDED
Status: DISCONTINUED | OUTPATIENT
Start: 2021-07-01 | End: 2021-07-01

## 2021-07-01 RX ORDER — PROPOFOL 10 MG/ML
INJECTION, EMULSION INTRAVENOUS AS NEEDED
Status: DISCONTINUED | OUTPATIENT
Start: 2021-07-01 | End: 2021-07-01

## 2021-07-01 RX ORDER — SODIUM CHLORIDE, SODIUM LACTATE, POTASSIUM CHLORIDE, CALCIUM CHLORIDE 600; 310; 30; 20 MG/100ML; MG/100ML; MG/100ML; MG/100ML
INJECTION, SOLUTION INTRAVENOUS CONTINUOUS PRN
Status: DISCONTINUED | OUTPATIENT
Start: 2021-07-01 | End: 2021-07-01

## 2021-07-01 RX ORDER — SENNOSIDES 8.6 MG
1 TABLET ORAL
Status: DISCONTINUED | OUTPATIENT
Start: 2021-07-01 | End: 2021-07-02 | Stop reason: HOSPADM

## 2021-07-01 RX ORDER — SODIUM CHLORIDE 9 MG/ML
INJECTION, SOLUTION INTRAVENOUS CONTINUOUS PRN
Status: DISCONTINUED | OUTPATIENT
Start: 2021-07-01 | End: 2021-07-01

## 2021-07-01 RX ORDER — LIDOCAINE HYDROCHLORIDE 10 MG/ML
INJECTION, SOLUTION EPIDURAL; INFILTRATION; INTRACAUDAL; PERINEURAL AS NEEDED
Status: DISCONTINUED | OUTPATIENT
Start: 2021-07-01 | End: 2021-07-01

## 2021-07-01 RX ORDER — SODIUM CHLORIDE, SODIUM LACTATE, POTASSIUM CHLORIDE, CALCIUM CHLORIDE 600; 310; 30; 20 MG/100ML; MG/100ML; MG/100ML; MG/100ML
100 INJECTION, SOLUTION INTRAVENOUS CONTINUOUS
Status: DISCONTINUED | OUTPATIENT
Start: 2021-07-01 | End: 2021-07-02 | Stop reason: HOSPADM

## 2021-07-01 RX ORDER — ONDANSETRON 2 MG/ML
4 INJECTION INTRAMUSCULAR; INTRAVENOUS ONCE AS NEEDED
Status: CANCELLED | OUTPATIENT
Start: 2021-07-01

## 2021-07-01 RX ORDER — MAGNESIUM HYDROXIDE 1200 MG/15ML
LIQUID ORAL AS NEEDED
Status: DISCONTINUED | OUTPATIENT
Start: 2021-07-01 | End: 2021-07-01 | Stop reason: HOSPADM

## 2021-07-01 RX ORDER — GLYCOPYRROLATE 0.2 MG/ML
INJECTION INTRAMUSCULAR; INTRAVENOUS AS NEEDED
Status: DISCONTINUED | OUTPATIENT
Start: 2021-07-01 | End: 2021-07-01

## 2021-07-01 RX ORDER — DEXAMETHASONE SODIUM PHOSPHATE 10 MG/ML
INJECTION, SOLUTION INTRAMUSCULAR; INTRAVENOUS AS NEEDED
Status: DISCONTINUED | OUTPATIENT
Start: 2021-07-01 | End: 2021-07-01

## 2021-07-01 RX ORDER — HYDROMORPHONE HCL 110MG/55ML
PATIENT CONTROLLED ANALGESIA SYRINGE INTRAVENOUS AS NEEDED
Status: DISCONTINUED | OUTPATIENT
Start: 2021-07-01 | End: 2021-07-01

## 2021-07-01 RX ORDER — CEFAZOLIN SODIUM 1 G/3ML
INJECTION, POWDER, FOR SOLUTION INTRAMUSCULAR; INTRAVENOUS AS NEEDED
Status: DISCONTINUED | OUTPATIENT
Start: 2021-07-01 | End: 2021-07-01

## 2021-07-01 RX ORDER — METHOCARBAMOL 500 MG/1
500 TABLET, FILM COATED ORAL EVERY 6 HOURS PRN
Status: DISCONTINUED | OUTPATIENT
Start: 2021-07-01 | End: 2021-07-02 | Stop reason: HOSPADM

## 2021-07-01 RX ORDER — DEXMEDETOMIDINE HYDROCHLORIDE 100 UG/ML
INJECTION, SOLUTION INTRAVENOUS AS NEEDED
Status: DISCONTINUED | OUTPATIENT
Start: 2021-07-01 | End: 2021-07-01

## 2021-07-01 RX ORDER — NEOSTIGMINE METHYLSULFATE 1 MG/ML
INJECTION INTRAVENOUS AS NEEDED
Status: DISCONTINUED | OUTPATIENT
Start: 2021-07-01 | End: 2021-07-01

## 2021-07-01 RX ORDER — OXYCODONE HYDROCHLORIDE 10 MG/1
10 TABLET ORAL EVERY 4 HOURS PRN
Status: DISCONTINUED | OUTPATIENT
Start: 2021-07-01 | End: 2021-07-02 | Stop reason: HOSPADM

## 2021-07-01 RX ORDER — CEFAZOLIN SODIUM 2 G/50ML
2000 SOLUTION INTRAVENOUS EVERY 8 HOURS
Status: COMPLETED | OUTPATIENT
Start: 2021-07-02 | End: 2021-07-02

## 2021-07-01 RX ORDER — HYDROMORPHONE HCL/PF 1 MG/ML
0.5 SYRINGE (ML) INJECTION
Status: DISCONTINUED | OUTPATIENT
Start: 2021-07-01 | End: 2021-07-01

## 2021-07-01 RX ORDER — SODIUM CHLORIDE, SODIUM LACTATE, POTASSIUM CHLORIDE, CALCIUM CHLORIDE 600; 310; 30; 20 MG/100ML; MG/100ML; MG/100ML; MG/100ML
75 INJECTION, SOLUTION INTRAVENOUS CONTINUOUS
Status: CANCELLED | OUTPATIENT
Start: 2021-07-01

## 2021-07-01 RX ORDER — VANCOMYCIN HYDROCHLORIDE 500 MG/10ML
INJECTION, POWDER, LYOPHILIZED, FOR SOLUTION INTRAVENOUS AS NEEDED
Status: DISCONTINUED | OUTPATIENT
Start: 2021-07-01 | End: 2021-07-01 | Stop reason: HOSPADM

## 2021-07-01 RX ORDER — PANTOPRAZOLE SODIUM 20 MG/1
20 TABLET, DELAYED RELEASE ORAL
Status: DISCONTINUED | OUTPATIENT
Start: 2021-07-02 | End: 2021-07-02 | Stop reason: HOSPADM

## 2021-07-01 RX ORDER — ACETAMINOPHEN 325 MG/1
650 TABLET ORAL EVERY 6 HOURS PRN
Status: DISCONTINUED | OUTPATIENT
Start: 2021-07-01 | End: 2021-07-02 | Stop reason: HOSPADM

## 2021-07-01 RX ORDER — SUCCINYLCHOLINE/SOD CL,ISO/PF 100 MG/5ML
SYRINGE (ML) INTRAVENOUS AS NEEDED
Status: DISCONTINUED | OUTPATIENT
Start: 2021-07-01 | End: 2021-07-01

## 2021-07-01 RX ORDER — HYDROMORPHONE HCL/PF 1 MG/ML
0.5 SYRINGE (ML) INJECTION
Status: DISCONTINUED | OUTPATIENT
Start: 2021-07-01 | End: 2021-07-02 | Stop reason: HOSPADM

## 2021-07-01 RX ORDER — BUPIVACAINE HYDROCHLORIDE 2.5 MG/ML
INJECTION, SOLUTION EPIDURAL; INFILTRATION; INTRACAUDAL AS NEEDED
Status: DISCONTINUED | OUTPATIENT
Start: 2021-07-01 | End: 2021-07-01

## 2021-07-01 RX ADMIN — ONDANSETRON 4 MG: 2 INJECTION INTRAMUSCULAR; INTRAVENOUS at 17:54

## 2021-07-01 RX ADMIN — DEXAMETHASONE SODIUM PHOSPHATE 10 MG: 10 INJECTION, SOLUTION INTRAMUSCULAR; INTRAVENOUS at 13:26

## 2021-07-01 RX ADMIN — GABAPENTIN 100 MG: 100 CAPSULE ORAL at 21:13

## 2021-07-01 RX ADMIN — ROCURONIUM BROMIDE 20 MG: 50 INJECTION, SOLUTION INTRAVENOUS at 14:18

## 2021-07-01 RX ADMIN — ROCURONIUM BROMIDE 10 MG: 50 INJECTION, SOLUTION INTRAVENOUS at 16:36

## 2021-07-01 RX ADMIN — SODIUM CHLORIDE: 0.9 INJECTION, SOLUTION INTRAVENOUS at 13:13

## 2021-07-01 RX ADMIN — Medication 80 MG: at 13:06

## 2021-07-01 RX ADMIN — GLYCOPYRROLATE 0.6 MG: 0.2 INJECTION, SOLUTION INTRAMUSCULAR; INTRAVENOUS at 18:36

## 2021-07-01 RX ADMIN — CEFAZOLIN 2000 MG: 1 INJECTION, POWDER, FOR SOLUTION INTRAMUSCULAR; INTRAVENOUS at 17:11

## 2021-07-01 RX ADMIN — HYDROMORPHONE HYDROCHLORIDE 0.2 MG: 2 INJECTION, SOLUTION INTRAMUSCULAR; INTRAVENOUS; SUBCUTANEOUS at 13:45

## 2021-07-01 RX ADMIN — PROPOFOL 200 MG: 10 INJECTION, EMULSION INTRAVENOUS at 13:05

## 2021-07-01 RX ADMIN — NEOSTIGMINE METHYLSULFATE 4 MG: 1 INJECTION INTRAVENOUS at 18:36

## 2021-07-01 RX ADMIN — SODIUM CHLORIDE, SODIUM LACTATE, POTASSIUM CHLORIDE, AND CALCIUM CHLORIDE 100 ML/HR: .6; .31; .03; .02 INJECTION, SOLUTION INTRAVENOUS at 21:18

## 2021-07-01 RX ADMIN — ROCURONIUM BROMIDE 50 MG: 50 INJECTION, SOLUTION INTRAVENOUS at 13:17

## 2021-07-01 RX ADMIN — EPHEDRINE SULFATE 5 MG: 50 INJECTION, SOLUTION INTRAVENOUS at 15:50

## 2021-07-01 RX ADMIN — FENTANYL CITRATE 50 MCG: 50 INJECTION INTRAMUSCULAR; INTRAVENOUS at 15:07

## 2021-07-01 RX ADMIN — CEFAZOLIN 2000 MG: 1 INJECTION, POWDER, FOR SOLUTION INTRAMUSCULAR; INTRAVENOUS at 13:12

## 2021-07-01 RX ADMIN — BUPIVACAINE HYDROCHLORIDE 5 ML: 2.5 INJECTION, SOLUTION EPIDURAL; INFILTRATION; INTRACAUDAL at 18:25

## 2021-07-01 RX ADMIN — HYDROMORPHONE HYDROCHLORIDE 0.4 MG: 2 INJECTION, SOLUTION INTRAMUSCULAR; INTRAVENOUS; SUBCUTANEOUS at 14:00

## 2021-07-01 RX ADMIN — DEXMEDETOMIDINE HCL 8 MCG: 100 INJECTION INTRAVENOUS at 13:49

## 2021-07-01 RX ADMIN — DEXMEDETOMIDINE HCL 8 MCG: 100 INJECTION INTRAVENOUS at 13:58

## 2021-07-01 RX ADMIN — ALBUMIN (HUMAN): 12.5 INJECTION, SOLUTION INTRAVENOUS at 16:09

## 2021-07-01 RX ADMIN — SODIUM CHLORIDE: 0.9 INJECTION, SOLUTION INTRAVENOUS at 16:28

## 2021-07-01 RX ADMIN — ALBUMIN (HUMAN): 12.5 INJECTION, SOLUTION INTRAVENOUS at 18:23

## 2021-07-01 RX ADMIN — EPHEDRINE SULFATE 10 MG: 50 INJECTION, SOLUTION INTRAVENOUS at 17:55

## 2021-07-01 RX ADMIN — BUPIVACAINE 20 ML: 13.3 INJECTION, SUSPENSION, LIPOSOMAL INFILTRATION at 18:25

## 2021-07-01 RX ADMIN — SODIUM CHLORIDE 0.2 MCG/KG/HR: 900 INJECTION INTRAVENOUS at 13:57

## 2021-07-01 RX ADMIN — FENTANYL CITRATE 25 MCG: 50 INJECTION INTRAMUSCULAR; INTRAVENOUS at 13:05

## 2021-07-01 RX ADMIN — FENTANYL CITRATE 50 MCG: 50 INJECTION INTRAMUSCULAR; INTRAVENOUS at 17:36

## 2021-07-01 RX ADMIN — FENTANYL CITRATE 75 MCG: 50 INJECTION INTRAMUSCULAR; INTRAVENOUS at 13:42

## 2021-07-01 RX ADMIN — DEXMEDETOMIDINE HCL 8 MCG: 100 INJECTION INTRAVENOUS at 14:01

## 2021-07-01 RX ADMIN — SODIUM CHLORIDE, SODIUM LACTATE, POTASSIUM CHLORIDE, AND CALCIUM CHLORIDE: .6; .31; .03; .02 INJECTION, SOLUTION INTRAVENOUS at 13:00

## 2021-07-01 RX ADMIN — ROCURONIUM BROMIDE 10 MG: 50 INJECTION, SOLUTION INTRAVENOUS at 17:08

## 2021-07-01 RX ADMIN — FENTANYL CITRATE 50 MCG: 50 INJECTION INTRAMUSCULAR; INTRAVENOUS at 17:08

## 2021-07-01 RX ADMIN — FENTANYL CITRATE 50 MCG: 50 INJECTION INTRAMUSCULAR; INTRAVENOUS at 14:40

## 2021-07-01 RX ADMIN — DEXMEDETOMIDINE HCL 4 MCG: 100 INJECTION INTRAVENOUS at 14:25

## 2021-07-01 RX ADMIN — LIDOCAINE HYDROCHLORIDE 100 MG: 10 INJECTION, SOLUTION EPIDURAL; INFILTRATION; INTRACAUDAL; PERINEURAL at 13:05

## 2021-07-01 RX ADMIN — HYDROMORPHONE HYDROCHLORIDE 0.4 MG: 2 INJECTION, SOLUTION INTRAMUSCULAR; INTRAVENOUS; SUBCUTANEOUS at 14:03

## 2021-07-01 RX ADMIN — ROCURONIUM BROMIDE 10 MG: 50 INJECTION, SOLUTION INTRAVENOUS at 15:24

## 2021-07-01 NOTE — ANESTHESIA PROCEDURE NOTES
Peripheral Block    Patient location during procedure: holding area  Start time: 7/1/2021 6:25 PM  Reason for block: procedure for pain, at surgeon's request and post-op pain management  Staffing  Performed: anesthesiologist   Anesthesiologist: Constanza Barrett MD  Preanesthetic Checklist  Completed: patient identified, IV checked, site marked, risks and benefits discussed, surgical consent, monitors and equipment checked, pre-op evaluation and timeout performed  Peripheral Block  Patient position: left lateral decubitus  Prep: ChloraPrep  Patient monitoring: continuous pulse ox and frequent blood pressure checks  Block type: popliteal  Laterality: right  Injection technique: single-shot  Procedures: ultrasound guided, Ultrasound guidance required for the procedure to increase accuracy and safety of medication placement and decrease risk of complications    Ultrasound permanent image saved  Needle  Needle type: Stimuplex   Needle gauge: 22 G  Needle length: 10 cm  Needle localization: anatomical landmarks and ultrasound guidance  Test dose: negative  Assessment  Injection assessment: incremental injection, local visualized surrounding nerve on ultrasound, negative aspiration for heme, negative aspiration for CSF and no paresthesia on injection  Paresthesia pain: none  Heart rate change: no  Slow fractionated injection: yes  Post-procedure:  site cleaned  patient tolerated the procedure well with no immediate complications  Additional Notes  Single needle pass, needle visualized throughout

## 2021-07-01 NOTE — DISCHARGE INSTRUCTIONS
Discharge Instructions - Orthopedics  Lynn Salmeron 80 y o  male MRN: [id number]  Unit/Bed#: PACU 02    Weight Bearing Status:                                           Non weight bearing right lower extremity in splint    DVT prophylaxis:  Complete course of Lovenox as directed for 28 days    Pain:  Continue analgesics as directed    Showering Instructions:   Do not shower until followup     Dressing Instructions:   Keep dressing clean, dry and intact until follow up appointment  Driving Instructions:  No driving until cleared by Orthopaedic Surgery  PT/OT:  Continue PT/OT on outpatient basis as directed    Appt Instructions: If you do not have your appointment, please call the clinic at [phone] to follow up with Dr Mitch Venegas in 2 weeks    Contact the office sooner if you experience any increased numbness/tingling in the extremities        Miscellaneous:  None

## 2021-07-01 NOTE — ANESTHESIA POSTPROCEDURE EVALUATION
Post-Op Assessment Note    CV Status:  Stable  Pain Score: 0    Pain management: adequate     Mental Status:  Alert and awake   Hydration Status:  Euvolemic   PONV Controlled:  Controlled   Airway Patency:  Patent      Post Op Vitals Reviewed: Yes      Staff: CRNA         No complications documented      /64 (07/01/21 1909)    Temp (!) 97 3 °F (36 3 °C) (07/01/21 1909)    Pulse 88 (07/01/21 1909)   Resp 15 (07/01/21 1909)    SpO2 97 % (07/01/21 1909)

## 2021-07-01 NOTE — PROGRESS NOTES
In-basket notification that patient was admitted to [facility], [place], operated by [facility] for surgery on R calcaneus fracture  This outpatient RN care manager will continue to monitor EMR for patient's progress toward discharge and GUNNAR

## 2021-07-01 NOTE — INTERVAL H&P NOTE
H&P reviewed  After examining the patient I find no changes in the patients condition since the H&P had been written      Abd: Soft, ND, NT    Vitals:    07/01/21 0853   BP: 102/74   Pulse: 69   Resp: 19   Temp: (!) 97 4 °F (36 3 °C)   SpO2: 97%

## 2021-07-02 VITALS
TEMPERATURE: 98.1 F | DIASTOLIC BLOOD PRESSURE: 72 MMHG | BODY MASS INDEX: 25.6 KG/M2 | OXYGEN SATURATION: 98 % | SYSTOLIC BLOOD PRESSURE: 117 MMHG | HEART RATE: 85 BPM | HEIGHT: 71 IN | WEIGHT: 182.9 LBS | RESPIRATION RATE: 18 BRPM

## 2021-07-02 PROBLEM — Z87.81 STATUS POST OPEN REDUCTION AND INTERNAL FIXATION (ORIF) OF FRACTURE: Status: ACTIVE | Noted: 2021-07-02

## 2021-07-02 PROBLEM — Z98.890 S/P ORIF (OPEN REDUCTION INTERNAL FIXATION) FRACTURE: Status: ACTIVE | Noted: 2021-07-02

## 2021-07-02 PROBLEM — S92.011A DISPLACED FRACTURE OF BODY OF RIGHT CALCANEUS, INITIAL ENCOUNTER FOR CLOSED FRACTURE: Status: RESOLVED | Noted: 2021-06-01 | Resolved: 2021-07-02

## 2021-07-02 PROBLEM — Z98.890 STATUS POST OPEN REDUCTION AND INTERNAL FIXATION (ORIF) OF FRACTURE: Status: ACTIVE | Noted: 2021-07-02

## 2021-07-02 PROBLEM — Z87.81 S/P ORIF (OPEN REDUCTION INTERNAL FIXATION) FRACTURE: Status: ACTIVE | Noted: 2021-07-02

## 2021-07-02 LAB
ANION GAP SERPL CALCULATED.3IONS-SCNC: 8 MMOL/L (ref 4–13)
BUN SERPL-MCNC: 13 MG/DL (ref 5–25)
CALCIUM SERPL-MCNC: 9 MG/DL (ref 8.3–10.1)
CHLORIDE SERPL-SCNC: 106 MMOL/L (ref 100–108)
CO2 SERPL-SCNC: 23 MMOL/L (ref 21–32)
CREAT SERPL-MCNC: 0.96 MG/DL (ref 0.6–1.3)
ERYTHROCYTE [DISTWIDTH] IN BLOOD BY AUTOMATED COUNT: 13.2 % (ref 11.6–15.1)
GFR SERPL CREATININE-BSD FRML MDRD: 80 ML/MIN/1.73SQ M
GLUCOSE P FAST SERPL-MCNC: 147 MG/DL (ref 65–99)
GLUCOSE SERPL-MCNC: 147 MG/DL (ref 65–140)
HCT VFR BLD AUTO: 39.7 % (ref 36.5–49.3)
HGB BLD-MCNC: 13.2 G/DL (ref 12–17)
MCH RBC QN AUTO: 31.6 PG (ref 26.8–34.3)
MCHC RBC AUTO-ENTMCNC: 33.2 G/DL (ref 31.4–37.4)
MCV RBC AUTO: 95 FL (ref 82–98)
PLATELET # BLD AUTO: 244 THOUSANDS/UL (ref 149–390)
PMV BLD AUTO: 9.1 FL (ref 8.9–12.7)
POTASSIUM SERPL-SCNC: 4.1 MMOL/L (ref 3.5–5.3)
RBC # BLD AUTO: 4.18 MILLION/UL (ref 3.88–5.62)
SODIUM SERPL-SCNC: 137 MMOL/L (ref 136–145)
WBC # BLD AUTO: 12.21 THOUSAND/UL (ref 4.31–10.16)

## 2021-07-02 PROCEDURE — 99024 POSTOP FOLLOW-UP VISIT: CPT | Performed by: PHYSICIAN ASSISTANT

## 2021-07-02 PROCEDURE — 97163 PT EVAL HIGH COMPLEX 45 MIN: CPT

## 2021-07-02 PROCEDURE — 80048 BASIC METABOLIC PNL TOTAL CA: CPT | Performed by: ORTHOPAEDIC SURGERY

## 2021-07-02 PROCEDURE — 99213 OFFICE O/P EST LOW 20 MIN: CPT | Performed by: ANESTHESIOLOGY

## 2021-07-02 PROCEDURE — NC001 PR NO CHARGE: Performed by: ORTHOPAEDIC SURGERY

## 2021-07-02 PROCEDURE — 85027 COMPLETE CBC AUTOMATED: CPT | Performed by: ORTHOPAEDIC SURGERY

## 2021-07-02 PROCEDURE — 97166 OT EVAL MOD COMPLEX 45 MIN: CPT

## 2021-07-02 RX ORDER — METHOCARBAMOL 500 MG/1
500 TABLET, FILM COATED ORAL 4 TIMES DAILY
Qty: 40 TABLET | Refills: 0 | Status: SHIPPED | OUTPATIENT
Start: 2021-07-02 | End: 2021-08-26 | Stop reason: ALTCHOICE

## 2021-07-02 RX ORDER — METHOCARBAMOL 750 MG/1
750 TABLET, FILM COATED ORAL EVERY 6 HOURS PRN
Qty: 30 TABLET | Refills: 0 | Status: SHIPPED | OUTPATIENT
Start: 2021-07-02 | End: 2021-07-02 | Stop reason: HOSPADM

## 2021-07-02 RX ORDER — OXYCODONE HYDROCHLORIDE 5 MG/1
5 TABLET ORAL EVERY 4 HOURS PRN
Qty: 30 TABLET | Refills: 0 | Status: SHIPPED | OUTPATIENT
Start: 2021-07-02 | End: 2021-08-26 | Stop reason: HOSPADM

## 2021-07-02 RX ORDER — GABAPENTIN 100 MG/1
100 CAPSULE ORAL 3 TIMES DAILY
Qty: 30 CAPSULE | Refills: 0 | Status: SHIPPED | OUTPATIENT
Start: 2021-07-02 | End: 2021-08-26 | Stop reason: HOSPADM

## 2021-07-02 RX ADMIN — CEFAZOLIN SODIUM 2000 MG: 2 SOLUTION INTRAVENOUS at 02:04

## 2021-07-02 RX ADMIN — ENOXAPARIN SODIUM 40 MG: 40 INJECTION SUBCUTANEOUS at 08:23

## 2021-07-02 RX ADMIN — CEFAZOLIN SODIUM 2000 MG: 2 SOLUTION INTRAVENOUS at 08:24

## 2021-07-02 RX ADMIN — PANTOPRAZOLE SODIUM 20 MG: 20 TABLET, DELAYED RELEASE ORAL at 05:05

## 2021-07-02 NOTE — DISCHARGE SUMMARY
Discharge Summary - Orthopedics   Ximena Estimable 80 y o  male MRN: [id number]  Unit/Bed#: -39    Attending Physician: Dr Chaparro Ascencio     Admitting diagnosis: Displaced fracture of body of right calcaneus, initial encounter for closed fracture [S92 011A]    Discharge diagnosis: Displaced fracture of body of right calcaneus, initial encounter for closed fracture [S92 011A]    Date of admission: 7/1/2021    Date of discharge: 07/02/21    Procedure: ORIF of R  Calcaneus      HPI & Hospital course:  Jamal tran 80 y  o  male who presented to the office for a follow-up evaluation of his right leg    Patient is 4 weeks status post ORIF right tibia  DOS 6/1/21    Closed treatment of a comminuted calcaneus fracture on right leg    Patient stated  That the leg was doing well overall    Patient stated he had very minimal pain in the lower leg and in the calcaneus region   Patient stated he had been taking oxycodone as needed for pain   Patient denied any numbness tingling in the foot   Patient denied any numbness tingling leg   Patient stated he had been compliant with anticoagulation therapy   Patient offers no other complaints at the office visit  Further examination of the Right Calcaneus revealed a fracture and the patient decided to undergo surgery rather than non-operative management  The patient was admitted to the hospital on 7/1/2021 to undergo an ORIF of the right calcaneus  The patient underwent ORIF of the right calcaneus without complications and a drain was placed intraoperatively  The patient also received a popliteal nerve block to assist with post-op pain  The drain was removed on POD 1 (7/2/2021) and the patient was subsequently discharged on the same day           0   Lab Value Date/Time    HGB 13 2 07/02/2021 0637    HGB 13 2 06/03/2021 0532    HGB 12 9 06/02/2021 0557    HGB 13 3 06/01/2021 0531    HGB 15 3 05/31/2021 1010    HGB 15 1 05/31/2021 1008          Body mass index is 25 51 kg/m²  Normal weight  Continue exercise and diet modifications  Discharge Instructions:   · Keep dressings clean and dry at all times   · Complete DVT prophylaxis as prescribed   · Physical therapy  · Follow-up as scheduled, otherwise call for appt  Discharge Medications: For the complete list of discharge medications, please refer to the patient's medication reconciliation

## 2021-07-02 NOTE — OCCUPATIONAL THERAPY NOTE
Occupational Therapy Evaluation     Patient Name: Imelda BARROS Date: 7/2/2021  Problem List  Principal Problem:    Displaced fracture of body of right calcaneus, initial encounter for closed fracture  Active Problems:    Status post open reduction and internal fixation (ORIF) of fracture (R distal Tibia)    S/P ORIF (open reduction internal fixation) fracture (R Calcaneus    Past Medical History  History reviewed  No pertinent past medical history  Past Surgical History  Past Surgical History:   Procedure Laterality Date    ORIF TIBIA FRACTURE Right 6/1/2021    Procedure: OPEN REDUCTION W/ INTERNAL FIXATION (ORIF) TIBIA;  Surgeon: Valentine Kennedy MD;  [facility];  Service: Orthopedics    NH OPEN TREATMENT CALCANEAL FRACTURE Right 7/1/2021    Procedure: OPEN REDUCTION W/ INTERNAL FIXATION (ORIF) RIGHT CALCANEUS FRACTURE;  Surgeon: Matt Berrios MD;  [facility];  Service: Orthopedics           07/02/21 0915   OT Last Visit   OT Visit Date 07/02/21   Note Type   Note type Evaluation   Restrictions/Precautions   Weight Bearing Precautions Per Order Yes   RUE Weight Bearing Per Order WBAT   LUE Weight Bearing Per Order WBAT   RLE Weight Bearing Per Order NWB   LLE Weight Bearing Per Order WBAT   Braces or Orthoses Splint   Pain Assessment   Pain Assessment Tool Pain Assessment not indicated - pt denies pain   Hospital Pain Intervention(s) Repositioned; Ambulation/increased activity; Emotional support;Relaxation technique   Home Living   Type of Home House   Home Layout Multi-level; Able to live on main level with bedroom/bathroom   Bathroom Shower/Tub Tub/shower unit   Bathroom Toilet Standard   Bathroom Equipment Commode; Shower chair   Home Equipment Walker  (knee scooter)   Prior Function   Level of [place] Independent with ADLs and functional mobility; Needs assistance with IADLs   Lives With Spouse; Family   Receives Help From Family   ADL Assistance Independent   IADLs Needs assistance   Falls in the last 6 months 1 to 4   Vocational Retired   Lifestyle   Autonomy I basic adls and mobility - wife assists prn and has been managing iadls   Reciprocal Relationships supportive family who provide assist prn   Service to Others retired   Intrinsic Gratification mostly sedentary pta   Subjective   Subjective "My wife makes sure I have everything I need before she goes to work"   ADL   Eating Assistance 7  Independent   Grooming Assistance 5  [address] [facility] 5  Supervision/Setup   [address] 5  Supervision/Setup   [address] [facility] 5  Supervision/Setup    [facility] [address]  [address]  5  Supervision/Setup   Functional Assistance 5  Supervision/Setup   Bed Mobility   Supine to [address] [facility] to Stand 5  Supervision   Stand to Sit 5  Supervision   Stand pivot 5  Supervision   Functional Mobility   Functional Mobility 5  Supervision   Additional items Rolling walker   Balance   Static Sitting Good   Dynamic Sitting Fair +   [place]   Activity Tolerance   Activity Tolerance Patient tolerated treatment well   RUE Assessment   RUE Assessment WFL   LUE Assessment   LUE Assessment WFL   Cognition   Overall Cognitive Status WFL   Assessment   Limitation Decreased ADL status; Decreased endurance;Decreased self-care trans;Decreased high-level ADLs   Prognosis Good   Assessment Pt is a 80 y o  male who was admitted to [facility] on 7/1/2021 with Displaced fracture of body of right calcaneus, initial encounter for closed fracture s/p ORIF Pt's problem list also includes PMH of GERD  At baseline pt was completing adls and mobility independently with assist from spouse PRN (spongebathing PTA) - wife has been managing iadls  Pt lives with wife in multilevel home with [address]   Currently pt requires sba for overall ADLS and sba for functional mobility/transfers  Pt currently presents with impairments in the following categories -difficulty performing ADLS, difficulty performing IADLS  and environment activity tolerance, endurance and standing balance/tolerance  These impairments, as well as pt's fatigue, pain, orthopedic restricitions , WBS , decreased caregiver support, risk for falls and home environment  limit pt's ability to safely engage in all baseline areas of occupation, includingbathing, dressing, functional mobility/transfers, community mobility, laundry , driving, house maintenance, meal prep, cleaning, social participation  and leisure activities however has supportive family who are able to provide assist prn and all necessary DME needs From OT standpoint, recommend return home with family support upon D/C  No further acute OT needs indicated at this time - Anticipate d/c home with family support when medically cleared - d/c from caseload   Goals   Patient Goals go home    Plan   OT Frequency Eval only   Recommendation   OT Discharge Recommendation No rehabilitation needs   OT - OK to Discharge Yes   AM-PAC Daily Activity Inpatient   Lower Body Dressing 3   Bathing 3   Toileting 3   Upper Body Dressing 4   Grooming 4   Eating 4   Daily Activity Raw Score 21   Daily Activity Standardized Score (Calc for Raw Score >=11) 44 27   AM-PAC Applied Cognition Inpatient   Following a Speech/Presentation 4   Understanding Ordinary Conversation 4   Taking Medications 4   Remembering Where Things Are Placed or Put Away 4   Remembering List of 4-5 Errands 4   Taking Care of Complicated Tasks 4   Applied Cognition Raw Score 24   Applied Cognition Standardized Score 62 21     The patient's raw score on the AM-PAC Daily Activity inpatient short form is 21, standardized score is 44 27, greater than 39 4  Patients at this level are likely to benefit from discharge to home   Please refer to the recommendation of the Occupational Therapist for safe discharge planning      [facility] [place], [place]

## 2021-07-02 NOTE — PHYSICAL THERAPY NOTE
Physical Therapy Evaluation     Patient's Name: Grant Tony    Admitting Diagnosis  Displaced fracture of body of right calcaneus, initial encounter for closed fracture [S92 011A]    Problem List  Patient Active Problem List   Diagnosis    Gastroesophageal reflux disease    Fall from height of greater than 3 feet    Tibia/fibula fracture, right, closed, initial encounter    Displaced fracture of body of right calcaneus, initial encounter for closed fracture    Acute pain due to trauma    Acute pain of right shoulder    Status post open reduction and internal fixation (ORIF) of fracture (R distal Tibia)    S/P ORIF (open reduction internal fixation) fracture (R Calcaneus       Past Medical History  History reviewed  No pertinent past medical history  Past Surgical History  Past Surgical History:   Procedure Laterality Date    ORIF TIBIA FRACTURE Right 6/1/2021    Procedure: OPEN REDUCTION W/ INTERNAL FIXATION (ORIF) TIBIA;  Surgeon: Armando Lofton MD;  Location: [facility];  Service: Orthopedics    [place] OPEN TREATMENT CALCANEAL FRACTURE Right 7/1/2021    Procedure: OPEN REDUCTION W/ INTERNAL FIXATION (ORIF) RIGHT CALCANEUS FRACTURE;  Surgeon: Ennis Gottron, MD;  Location: [facility];  Service: Orthopedics        07/02/21 0916   PT Last Visit   PT Visit Date 07/02/21   Note Type   Note type Evaluation   Pain Assessment   Pain Assessment Tool Pain Assessment not indicated - pt denies pain   Pain Score No Pain   Hospital Pain Intervention(s) Repositioned; Ambulation/increased activity   Home Living   Type of Home House   Home Layout Multi-level   Bathroom Shower/Tub Tub/shower unit   Bathroom Toilet Standard   Bathroom Equipment Commode; [address]; Other (Comment)  (knee scooter)   Prior Function   Level of [place] Independent with ADLs and functional mobility   Lives With Spouse; Family   Receives Help From Family   ADL Assistance Independent   IADLs Needs assistance Falls in the last 6 months 1 to 4   Vocational Retired   Restrictions/Precautions   [place] Bearing Precautions Per Order Yes   RUE [place] Bearing Per Order WBAT   LUE Weight Bearing Per Order WBAT   RLE Weight Bearing Per Order NWB   LLE Weight Bearing Per Order WBAT   Braces or Orthoses Splint   General   Family/Caregiver Present No   Cognition   Orientation Level Oriented X4   RLE Assessment   RLE Assessment WFL   LLE Assessment   LLE Assessment WFL   Coordination   Movements are Fluid and Coordinated 0   Bed Mobility   Supine to Sit 5  Supervision   Transfers   Sit to Stand 5  Supervision   Stand to Sit 5  Supervision   Stand pivot 5  Supervision   Ambulation/Elevation   Gait pattern Excessively slow  (Ho[place])   Gait Assistance 5  Supervision   Additional items Assist x 1   Assistive Device Rolling walker   Distance 30   Balance   Static Sitting Good   Dynamic Sitting Fair +   Static Standing Fair +   Dynamic Standing Fair   Ambulatory Fair   Endurance Deficit   Endurance Deficit Yes   Endurance Deficit Description compared to baseline decreased activity tolerance with NWB LE   Activity Tolerance   Activity Tolerance Patient tolerated treatment well   Medical Staff Made Aware OT and Ortho for D/C planning   Nurse Made Aware yes, nsg gave clearance to work with pt   Assessment   Prognosis Good   Problem List Decreased strength;Decreased endurance; Impaired balance;Decreased mobility; Decreased coordination;Decreased safety awareness;Pain;Orthopedic restrictions;Decreased skin integrity   Assessment Pt is 80 y o  male seen for PT evaluation s/p admit to [facility] on 7/1/2021 w/ Displaced fracture of body of right calcaneus, initial encounter for closed fractures/p ORIF  PT consulted to assess pt's functional mobility and d/c needs  Order placed for PT eval and tx, w/ NWB R LE order  Comorbidities affecting pt's physical performance at time of assessment include: tibial fracture s/p fall from roof    PTA, pt was Mod I with RW and kneeling scooter at home, pt reports he has been bathing at the sink, reports he bumps up the steps on his buttock as needed  Personal factors affecting pt at time of IE include: inaccessible home environment, ambulating w/ assistive device, inability to navigate level surfaces w/o external assistance, limited home support, decreased initiation and engagement, impulsivity, unable to perform physical activity, limited insight into impairments, inability to perform IADLs and inability to perform ADLs  Please find objective findings from PT assessment regarding body systems outlined above with impairments and limitations including weakness, impaired balance, decreased endurance, gait deviations, pain, decreased activity tolerance, decreased functional mobility tolerance, decreased safety awareness, fall risk and orthopedic restrictions  Pt completed all mobility with S or better level of A  Pt reports that things have been going ok at home as he has been NWB for several weeks  Offered no new concerns related to mobility post D/C  The following objective measures performed on IE also reveal limitations: The patient's AM-PAC Basic Mobility Inpatient Short Form Raw Score is 21, Standardized Score is 45 55  A standardized score greater than 42 9 suggests the patient may benefit from discharge to home  Please also refer to the recommendation of the Physical Therapist for safe discharge planning  Pt to benefit from continued mobility with nsg to maintain level of functional independent mobility and consistency  From PT/mobility standpoint, recommendation at time of d/c would be no rehabilitation needs pending progress in order to facilitate return to PLOF     Barriers to Discharge Inaccessible home environment;Decreased caregiver support   Barriers to Discharge Comments Pt reports that he and his spouse have mastered system while she is at work for food   Goals   Patient Goals To go home   Plan Treatment/Interventions OT; Spoke to case management;Spoke to nursing;Gait training;Bed mobility; Patient/family training; Endurance training;LE strengthening/ROM; Functional transfer training   Recommendation   PT Discharge Recommendation No rehabilitation needs   PT - OK to Discharge Yes   AM-PAC Basic Mobility Inpatient   Turning in Bed Without Bedrails 4   Lying on Back to Sitting on Edge of Flat Bed 4   Moving Bed to Chair 4   Standing Up From Chair 3   Walk in Room 3   Climb 3-5 Stairs 3   Basic Mobility Inpatient Raw Score 21   Basic Mobility Standardized Score 45 55           Janice Shoemaker, PT

## 2021-07-02 NOTE — PROGRESS NOTES
Orthopedics   Esha Rasmussen 80 y o  male MRN: [id number]  Unit/Bed#: -01    Subjective:  80 y o male post operative day 1 status post right Calcaneus ORIF  Pt doing well  Pain controlled - block still working  Not much sleep last night secondary to urinary retention  Labs:  0   Lab Value Date/Time    HCT 40 2 06/03/2021 0532    HCT 39 1 06/02/2021 0557    HCT 39 4 06/01/2021 0531    HGB 13 2 06/03/2021 0532    HGB 12 9 06/02/2021 0557    HGB 13 3 06/01/2021 0531    INR 0 98 05/31/2021 1008    WBC 8 07 06/03/2021 0532    WBC 9 97 06/02/2021 0557    WBC 6 63 06/01/2021 0531       Meds:    Current Facility-Administered Medications:     acetaminophen (TYLENOL) tablet 650 mg, 650 mg, Oral, Q6H PRN, Dori Quinones MD    ceFAZolin (ANCEF) IVPB (premix in dextrose) 2,000 mg 50 mL, 2,000 mg, Intravenous, Q8H, Dori Quinones MD, Last Rate: 100 mL/hr at 07/02/21 0204, 2,000 mg at 07/02/21 0204    enoxaparin (LOVENOX) subcutaneous injection 40 mg, 40 mg, Subcutaneous, Q24H [address], Dori Quinones MD    gabapentin (NEURONTIN) capsule 100 mg, 100 mg, Oral, TID, Dori Quinones MD, 100 mg at 07/01/21 2113    HYDROmorphone (DILAUDID) injection 0 5 mg, 0 5 mg, Intravenous, Q3H PRN, Dori Quinones MD    lactated ringers infusion, 100 mL/hr, Intravenous, Continuous, Dori Quinones MD, Stopped at 07/02/21 0507    methocarbamol (ROBAXIN) tablet 500 mg, 500 mg, Oral, Q6H PRN, Dori Quinones MD    ondansetron (ZOFRAN) injection 4 mg, 4 mg, Intravenous, Q6H PRN, Dori Quinones MD    oxyCODONE (ROXICODONE) IR tablet 10 mg, 10 mg, Oral, Q4H PRN, Dori Quinones MD    oxyCODONE (ROXICODONE) IR tablet 5 mg, 5 mg, Oral, Q4H PRN, Dori Quinones MD    pantoprazole (PROTONIX) EC tablet 20 mg, 20 mg, Oral, Early Morning, Dori Quinones MD, 20 mg at 07/02/21 0505    senna (SENOKOT) tablet 8 6 mg, 1 tablet, Oral, HS PRN, Dori Quinones MD    Ins and Outs:  I/O last 24 hours:   In: 3400 [I V :2650; IV Piggyback:750]  Out: 7806 [Urine:2500; Drains:155; Blood:300]    Physical:  Vitals:    07/01/21 2254   BP: 121/71   Pulse: 100   Resp: 16   Temp: 97 7 °F (36 5 °C)   SpO2: 97%     right lower extremity:  · Dressings clean Dry Intact  · Splint in place  · Drain in place  · No sensation or motor in toes - BLOCK  · Toes warm    _*_*_*_*_*_*_*_*_*_*_*_*_*_*_*_*_*_*_*_*_*_*_*_*_*_*_*_*_*_*_*_*_*_*_*_*_*_*_*_*_*    Assessment:   Post operative day 1 status post right Calcaneus ORIF  Doing well      Plan:  · Nonweight bearing right lower extremity  · Up out of bed with assist  · Urinary retention protocol  · Drain monitoring  · Ice, Elevation to affected extremity  · Analgesics  · DVT prophylaxis  · Dispo: discharge planning  · Will continue to assess for acute blood loss anemia - labs currently pending    Sravani Arellano PA-C

## 2021-07-02 NOTE — CONSULTS
Peripheral Nerve Block Follow-up Note - Acute Pain Service    Haris Agarwal 80 y o  male MRN: [id number]  Unit/Bed#: -01 Encounter: [id number]      Assessment:   Principal Problem:    Displaced fracture of body of right calcaneus, initial encounter for closed fracture    Haris Agarwal is a 80y o  year old male post operative day 1 status post right Calcaneus ORIF  Pt doing well  Pain controlled - right popliteal block still working  Not much sleep last night secondary to urinary retention  Patient states he still has numbness and heaviness and his RLE and denies pain at the operative site  Plan:   - Right popliteal block working well, patient denies pain  He understands that the block will begin to wear off later today or tomorrow and he should preemptively take PRN pain meds    APS will sign off, please reconsult if needed  Pain History  Current pain location(s): Right ankle  Pain Scale:   0/10  Quality: N/A  24 hour history: POD right calcaneous ORIF with well functioning popliteal block  Meds/Allergies     No Known Allergies    Objective     Temp:  [97 3 °F (36 3 °C)-98 1 °F (36 7 °C)] 98 1 °F (36 7 °C)  HR:  [] 85  Resp:  [15-23] 18  BP: ()/(58-74) 117/72    Physical Exam  Vitals and nursing note reviewed  Constitutional:       Appearance: Normal appearance  He is normal weight  HENT:      Head: Normocephalic and atraumatic  Cardiovascular:      Rate and Rhythm: Normal rate and regular rhythm  Pulses: Normal pulses  Heart sounds: Normal heart sounds  Pulmonary:      Effort: Pulmonary effort is normal       Breath sounds: Normal breath sounds  Abdominal:      General: Abdomen is flat  Bowel sounds are normal       Palpations: Abdomen is soft  Musculoskeletal:         General: Normal range of motion  Cervical back: Normal range of motion and neck supple  Comments: RLE heaviness 2/2 block   Skin:     General: Skin is warm and dry     Neurological: General: No focal deficit present  Mental Status: He is alert and oriented to person, place, and time  Mental status is at baseline  Psychiatric:         Mood and Affect: Mood normal            Lab Results:   Results from last 7 days   Lab Units 07/02/21  0637   WBC Thousand/uL 12 21*   HEMOGLOBIN g/dL 13 2   HEMATOCRIT % 39 7   PLATELETS Thousands/uL 244      Results from last 7 days   Lab Units 07/02/21  0637   POTASSIUM mmol/L 4 1   CHLORIDE mmol/L 106   CO2 mmol/L 23   BUN mg/dL 13   CREATININE mg/dL 0 96   CALCIUM mg/dL 9 0     Counseling / Coordination of Care  Total floor / unit time spent today 15 minutes  Greater than 50% of total time was spent with the patient and / or family counseling and / or coordination of care  A description of the counseling / coordination of care:     Please note that the APS provides consultative services regarding pain management only  With the exception of ketamine, peripheral nerve catheters, and epidural infusions (and except when indicated), final decisions regarding starting or changing doses of analgesic medications are at the discretion of the consulting service  Off hours consultation and/or medication management is generally not available      Leonides Klinefelter, MD  Acute Pain Service

## 2021-07-08 ENCOUNTER — PATIENT OUTREACH (OUTPATIENT)
Dept: CASE MANAGEMENT | Facility: OTHER | Age: 71
End: 2021-07-08

## 2021-07-08 NOTE — OP NOTE
OPERATIVE REPORT  PATIENT NAME: Magen Young    :  1950  MRN: [id number]  Pt Location:  OR ROOM 17    SURGERY DATE: 2021    Surgeon(s) and Role:     * Donald Severs, MD - Primary     * Barrington Lyle PA-C - Katlin Shearer MD - Assisting    Preop Diagnosis:  Right calcaneus fracture    Post-Op Diagnosis Codes:  Same    Procedure(s) (LRB):  OPEN REDUCTION W/ INTERNAL FIXATION (ORIF) RIGHT CALCANEUS FRACTURE (Right)    Procedures:  ORIF R calcaneus fracture (CPT 12882)  Application RLE short leg splint (CPT 57725)    Specimen(s):  * No specimens in log *    Estimated Blood Loss:   300 mL    Drains:  Closed/Suction Drain Posterior;Right Foot Accordion (Active)   Site Description Unable to view 21 0204   Dressing Status Clean;Dry; Intact 21 0204   Drainage Appearance Bloody 21 0204   Status Accordion suction 21 0204   Output (mL) 25 mL 21 1225   Number of days: 7       [REMOVED] Urethral Catheter Latex;Straight-tip 16 Fr  (Removed)   Number of days: 0       Anesthesia Type:   General    Operative Indications:  Right calcaneus fracture with significant loss of calcaneal height, heel widening, subtalar joint depression    Operative Findings:  See below    Complications:   None    Implants: Synthes 2 7mm VA calcaneus plate, 4 5 mm cannulated screws x2    Procedure and Technique:  Patient is a 73-year-old male who previously had his tibia fracture fixated by my partner who presents today for definitive management of his right calcaneus fracture which required multiple weeks for resolution of soft tissue swelling  Today's return to the OR represents a planned return to the OR for definitive fixation of calcaneus fracture  Patient's operative site, laterality, procedure, consent were verified in the preoperative area the patient was transitioned to the operating room    General anesthesia was provided by the anesthesia team and the the patient was placed into the left lateral decubitus position with all bony prominences padded  The right lower extremity was prepped and draped in the usual sterile fashion  After time-out for safety, extensile lateral approach to the calcaneus took place, taking care to elevated full-thickness flaps throughout the duration of the approach  The floor of the peroneal tendon sheath was identified and elevated off of the calcaneus taking care to protect the tendons throughout the exposure  Exposure came to the subtalar joint which was easily identified  Significant joint depression was present as well as a notable lateral displacement of the tuber component of the fracture  The lateral wall component was then excised with osteotome, placed in saline for later replacement  Osteotome was utilized to free up the depressed subtalar articular surface  A 4 0 mm Schanz pin was then placed into the tuber as the patient had significant hindfoot varus and lateral translation of tuberosity component of heel  This was manipulated into reduced position from a translational, length and alignment perspective, and longitudinal K-wires were placed from the posterior heel directed anteriorly as well as from the anterior process directed posteriorly to hold the alignment and length of the calcaneus  Attention was then turned to the depressed articular surface of the subtalar joint which was elevated and held in place with K-wires  30 cc of cancellous allograft chips were then tamped into the void left by the depressed fracture  The lateral wall was then smoothed with rongeur and placed back into the fracture bed  A Synthes calcaneus plate was then applied and 4 cortical screws were placed in the corners of the plate to reduce the plate to bone  Reduction was verified on biplane fluoroscopic imaging and found to be satisfactory remaining locking screws were placed into the plate to provide stable fixation    Initial wires holding calcaneal length and alignment were then replaced with fully-threaded 4 5 mm cannulated screws which were selected as we did not desire compression given the calcaneal shortening  All preliminary reduction wires were removed and reduction was found to be satisfactory  Wound was then copiously irrigated with sterile saline  A 10 [place] Hemovac drain was then placed with tube exiting through the anterior aspect of the foot The full-thickness flap was then replaced in deep tissue was closed with 0 Vicryl suture in figure-of-eight fashion  Skin layer closure took place with 3-0 nylon suture in Donati snap-cut technique creating a tension free closure  Sterile dressing consisted of Steri-Strips, Adaptic, sterile gauze, sterile ABD pad, sterile Webril  The patient was placed in a well-padded posterior splint with stirrup  All final counts, including but not limited to instrument, sponge, needle counts were correct  The patient was extubated, awakened and transitioned to the PACU in stable condition  There were no immediate complications  The patient will be nonweightbearing on the right lower extremity for an anticipated 10-12 weeks pending union and radiographic appearance  He will require Lovenox for 6 weeks  He will be admitted for pain control as well as drain output monitoring which we will remove I doubt this less than 30 mL per shift    I will see him in the clinic in 3 weeks for consideration for suture removal     Patient Disposition:  PACU     SIGNATURE: Grace Fink MD  DATE: July 8, 2021  TIME: 4:45 PM

## 2021-07-08 NOTE — PROGRESS NOTES
Outreach TC to patient for RN CM assessment  Patient reports that he has some discomfort (burning) in the surgical foot  Patient reports that the dressing is clean, dry and intact  No shadow drainage noted  Patient denies fever, chills, increased pain, odor or other s/s of infection from the surgical area  Patient has a follow up visit scheduled for 7/14/21 regarding his calcaneus fx and a follow up visit schedule for 7/15/21 for his tib/fib fx  Patient remains non-weight bearing on the RLE  Reviewed home medications, s/sx to report, future appointments and how/when to report symptoms to provider vs 911  Patient verbalizes understanding and agrees to additional calls

## 2021-07-11 DIAGNOSIS — S82.401A TIBIA/FIBULA FRACTURE, RIGHT, CLOSED, INITIAL ENCOUNTER: ICD-10-CM

## 2021-07-11 DIAGNOSIS — S92.011A DISPLACED FRACTURE OF BODY OF RIGHT CALCANEUS, INITIAL ENCOUNTER FOR CLOSED FRACTURE: Primary | ICD-10-CM

## 2021-07-11 DIAGNOSIS — S82.201A TIBIA/FIBULA FRACTURE, RIGHT, CLOSED, INITIAL ENCOUNTER: ICD-10-CM

## 2021-07-11 DIAGNOSIS — S82.251A CLOSED DISPLACED COMMINUTED FRACTURE OF SHAFT OF RIGHT TIBIA, INITIAL ENCOUNTER: ICD-10-CM

## 2021-07-14 ENCOUNTER — HOSPITAL ENCOUNTER (OUTPATIENT)
Dept: RADIOLOGY | Facility: HOSPITAL | Age: 71
Discharge: HOME | End: 2021-07-14
Attending: ORTHOPAEDIC SURGERY
Payer: MEDICARE

## 2021-07-14 ENCOUNTER — OFFICE VISIT (OUTPATIENT)
Dept: OBGYN CLINIC | Facility: HOSPITAL | Age: 71
End: 2021-07-14

## 2021-07-14 VITALS
WEIGHT: 182 LBS | DIASTOLIC BLOOD PRESSURE: 65 MMHG | HEIGHT: 71 IN | SYSTOLIC BLOOD PRESSURE: 99 MMHG | HEART RATE: 83 BPM | BODY MASS INDEX: 25.48 KG/M2

## 2021-07-14 DIAGNOSIS — S92.011A DISPLACED FRACTURE OF BODY OF RIGHT CALCANEUS, INITIAL ENCOUNTER FOR CLOSED FRACTURE: ICD-10-CM

## 2021-07-14 DIAGNOSIS — S92.011A DISPLACED FRACTURE OF BODY OF RIGHT CALCANEUS, INITIAL ENCOUNTER FOR CLOSED FRACTURE: Primary | ICD-10-CM

## 2021-07-14 PROCEDURE — 99024 POSTOP FOLLOW-UP VISIT: CPT | Performed by: ORTHOPAEDIC SURGERY

## 2021-07-14 PROCEDURE — 73650 X-RAY EXAM OF HEEL: CPT

## 2021-07-14 RX ORDER — OXYCODONE HYDROCHLORIDE 5 MG/1
5 TABLET ORAL EVERY 4 HOURS PRN
Qty: 30 TABLET | Refills: 0 | Status: SHIPPED | OUTPATIENT
Start: 2021-07-14 | End: 2021-08-26 | Stop reason: ALTCHOICE

## 2021-07-14 NOTE — PROGRESS NOTES
Orthopaedics Office Visit - Post-op Patient Visit    ASSESSMENT/PLAN:    Assessment:   6 weeks status post ORIF right tibia  DOS 6/1/21  2 weeks status post ORIF right calcaneus fracture  DOS 07/01/2021  Wound healing appropriately,  No concerns for wound dehiscence at this time      Plan:   - CONTINUE NONWEIGHTBEARING STATUS OF THE RIGHT LOWER EXTREMITY  - Over the counter analgesics as needed / directed   - Ice / heat as directed   - Stressed importance of elevation with patient at length  - Continue with Lovenox as directed ( 6 weeks total)  - Podous boot provided for patient  Begin gentle ROM exercises of the ankle   - Follow-up 1 week for suture removal       To Do Next Visit:  Sutures out     _____________________________________________________  CHIEF COMPLAINT:  Chief Complaint   Patient presents with    Right Foot - Post-op         SUBJECTIVE:  Wilmer Zamora is a 80 y o  male who presents 6 weeks status post ORIF right tibia  DOS 6/1/21, 2 weeks status post ORIF right calcaneus fracture  DOS 07/01/2021  Patient states that his foot is doing well overall  Patient does admit to having pain over the incision site that he describes as a burning type sensation  Patient states he has been compliant with nonweightbearing restrictions of the right lower extremity  Patient states he has been compliant with anticoagulation for the right lower extremity  Patient has been maintaining a high tide cam walker as directed  Patient denies any numbness tingling the foot  Patient denies any calf pain  Patient offers no other complaints at this time       SOCIAL HISTORY:  Social History     Tobacco Use    Smoking status: Never Smoker    Smokeless tobacco: Never Used   Vaping Use    Vaping Use: Never used   Substance Use Topics    Alcohol use: Never    Drug use: Never       MEDICATIONS:    Current Outpatient Medications:     enoxaparin (LOVENOX) 40 mg/0 4 mL, Inject 0 4 mL (40 mg total) under the skin daily for 28 days, Disp: 11 2 mL, Rfl: 0    omeprazole (PriLOSEC) 20 mg delayed release capsule, Take 20 mg by mouth daily, Disp: , Rfl:     oxyCODONE (ROXICODONE) 5 mg immediate release tablet, Take 1 tablet (5 mg total) by mouth every 4 (four) hours as needed for moderate painMax Daily Amount: 30 mg, Disp: 30 tablet, Rfl: 0    senna (SENOKOT) 8 6 mg, Take 2 tablets (17 2 mg total) by mouth daily, Disp: 10 tablet, Rfl: 0    acetaminophen (TYLENOL) 325 mg tablet, Take 3 tablets (975 mg total) by mouth every 8 (eight) hours (Patient not taking: Reported on 7/14/2021), Disp: 30 tablet, Rfl: 0    gabapentin (NEURONTIN) 100 mg capsule, Take 1 capsule (100 mg total) by mouth 3 (three) times a day for 10 days, Disp: 30 capsule, Rfl: 0    methocarbamol (ROBAXIN) 500 mg tablet, Take 1 tablet (500 mg total) by mouth 4 (four) times a day for 10 days, Disp: 40 tablet, Rfl: 0    REVIEW OF SYSTEMS:  MSK:  Right foot pain  Neuro: WNL   Pertinent items are otherwise noted in HPI  A comprehensive review of systems was otherwise negative     _____________________________________________________  PHYSICAL EXAMINATION:  Vital signs: BP 99/65   Pulse 83   Ht 5' 11" (1 803 m)   Wt 82 6 kg (182 lb)   BMI 25 38 kg/m²   General: No acute distress, awake and alert  Psychiatric: Mood and affect appear appropriate  HEENT: Trachea Midline, No torticollis, no apparent facial trauma  Cardiovascular: No audible murmurs; Extremities appear perfused  Pulmonary: No audible wheezing or stridor  Skin: No open lesions; see further details (if any) below      MUSCULOSKELETAL EXAMINATION:   right foot examination:  - Patient sitting comfortably in the office in no acute distress   -  Healing incision site with no surrounding erythema or ecchymosis present  Sutures intact with no obvious drainage present  -   Tenderness palpation noted over incision site    No other bony or soft tissue tenderness palpation noted at this time   - NV intact    _____________________________________________________  STUDIES REVIEWED:  I personally reviewed the images and interpretation is as follows:    Right calcaneus x-ray two views:   healing calcaneus fracture in acceptable anatomic alignment with hardware intact  PROCEDURES PERFORMED:  No procedures were performed at this time         Adam Kauffman PA-C - assisting    Mimi Krishnamurthy MD

## 2021-07-14 NOTE — PATIENT INSTRUCTIONS
- CONTINUE NONWEIGHTBEARING STATUS OF THE RIGHT LOWER EXTREMITY  - Over the counter analgesics as needed / directed   - Ice / heat as directed   - Stressed importance of elevation with patient at length  - Continue with Lovenox as directed ( 6 weeks total)  - Podous boot provided for patient   Begin gentle ROM exercises of the ankle   - Follow-up 1 week for suture removal

## 2021-07-15 ENCOUNTER — OFFICE VISIT (OUTPATIENT)
Dept: OBGYN CLINIC | Facility: HOSPITAL | Age: 71
End: 2021-07-15

## 2021-07-15 ENCOUNTER — HOSPITAL ENCOUNTER (OUTPATIENT)
Dept: RADIOLOGY | Facility: HOSPITAL | Age: 71
Discharge: HOME | End: 2021-07-15
Payer: MEDICARE

## 2021-07-15 VITALS
SYSTOLIC BLOOD PRESSURE: 107 MMHG | WEIGHT: 182 LBS | BODY MASS INDEX: 25.48 KG/M2 | HEIGHT: 71 IN | DIASTOLIC BLOOD PRESSURE: 69 MMHG | HEART RATE: 84 BPM

## 2021-07-15 DIAGNOSIS — S82.401A TIBIA/FIBULA FRACTURE, RIGHT, CLOSED, INITIAL ENCOUNTER: ICD-10-CM

## 2021-07-15 DIAGNOSIS — S82.201A TIBIA/FIBULA FRACTURE, RIGHT, CLOSED, INITIAL ENCOUNTER: ICD-10-CM

## 2021-07-15 DIAGNOSIS — S92.011A DISPLACED FRACTURE OF BODY OF RIGHT CALCANEUS, INITIAL ENCOUNTER FOR CLOSED FRACTURE: Primary | ICD-10-CM

## 2021-07-15 PROCEDURE — 73590 X-RAY EXAM OF LOWER LEG: CPT

## 2021-07-15 PROCEDURE — 99024 POSTOP FOLLOW-UP VISIT: CPT | Performed by: PHYSICIAN ASSISTANT

## 2021-07-15 NOTE — PROGRESS NOTES
Assessment:    6 weeks s/p ORIF right tibia done 6/1/2021 (Dr Abram Devlin)  Overall doing well  X-rays stable  Plan:    Non-weight bearing right lower extremity, in Podous boot  Elevation for swelling  Continue Lovenox to completion   Gentle ROM exercises of ankle   Pain control as needed, currently taking Oxycodone PRN  F/U 6 weeks for re-evaluation and new x-rays right tibia        Problem List Items Addressed This Visit        Musculoskeletal and Integument    Tibia/fibula fracture, right, closed, initial encounter    Relevant Orders    XR tibia fibula 2 vw right    Displaced fracture of body of right calcaneus, initial encounter for closed fracture - Primary                   Subjective:     Patient ID:  Rosalind Hameed is a 80 y o  male    HPI    6 weeks s/p ORIF right tibia done 6/1/2021  Patient states today he continues to have mild to moderate pain in the right lower extremity that is controlled  He has been prescribed Oxycodone as needed  He denies any associated numbness and tingling  No issues with his incisions  He has been compliant with nonweightbearing status  He is currently taking Lovenox as prescribed  He offers no other acute complaints at today's visit  Calcaneous injury being treated by Dr Colton Booker  The following portions of the patient's history were reviewed and updated as appropriate: allergies, current medications, past family history, past medical history, past social history, past surgical history and problem list     Review of Systems     Objective:    Imaging:  Right Tibia x-rays demonstrate visible fracture lines with some bony callous formation  Hardware is intact, in good alignment, unchanged      Vitals:    07/15/21 1505   BP: 107/69   Pulse: 84           Physical Exam     No acute distress  Patient examined in wheelchair  Right lower extremity:  Incisions clean dry intact with scabbing evident in the medial distal tibial region      Sutures for the calcaneus region are retained    Neurovascularly intact  No calf tenderness, no pitting edema

## 2021-07-22 ENCOUNTER — HOSPITAL ENCOUNTER (EMERGENCY)
Facility: HOSPITAL | Age: 71
Discharge: HOME | End: 2021-07-22
Attending: EMERGENCY MEDICINE
Payer: MEDICARE

## 2021-07-22 ENCOUNTER — OFFICE VISIT (OUTPATIENT)
Dept: OBGYN CLINIC | Facility: HOSPITAL | Age: 71
End: 2021-07-22

## 2021-07-22 ENCOUNTER — HOSPITAL ENCOUNTER (OUTPATIENT)
Dept: NON INVASIVE DIAGNOSTICS | Facility: HOSPITAL | Age: 71
Discharge: HOME | End: 2021-07-22
Payer: MEDICARE

## 2021-07-22 ENCOUNTER — PATIENT OUTREACH (OUTPATIENT)
Dept: CASE MANAGEMENT | Facility: OTHER | Age: 71
End: 2021-07-22

## 2021-07-22 VITALS
SYSTOLIC BLOOD PRESSURE: 152 MMHG | DIASTOLIC BLOOD PRESSURE: 80 MMHG | OXYGEN SATURATION: 97 % | HEIGHT: 71 IN | HEART RATE: 72 BPM | RESPIRATION RATE: 18 BRPM | TEMPERATURE: 96.6 F | WEIGHT: 183 LBS | BODY MASS INDEX: 25.62 KG/M2

## 2021-07-22 VITALS
WEIGHT: 182 LBS | HEIGHT: 71 IN | HEART RATE: 86 BPM | BODY MASS INDEX: 25.48 KG/M2 | DIASTOLIC BLOOD PRESSURE: 62 MMHG | SYSTOLIC BLOOD PRESSURE: 106 MMHG

## 2021-07-22 DIAGNOSIS — T81.89XA: Primary | ICD-10-CM

## 2021-07-22 DIAGNOSIS — M79.661 RIGHT CALF PAIN: ICD-10-CM

## 2021-07-22 DIAGNOSIS — I82.409: Primary | ICD-10-CM

## 2021-07-22 DIAGNOSIS — Z48.89 AFTERCARE FOLLOWING SURGERY: Primary | ICD-10-CM

## 2021-07-22 LAB
ANION GAP SERPL CALCULATED.3IONS-SCNC: 5 MMOL/L (ref 4–13)
BASOPHILS # BLD AUTO: 0.05 THOUSANDS/ΜL (ref 0–0.1)
BASOPHILS NFR BLD AUTO: 1 % (ref 0–1)
BUN SERPL-MCNC: 19 MG/DL (ref 5–25)
CALCIUM SERPL-MCNC: 9.8 MG/DL (ref 8.3–10.1)
CHLORIDE SERPL-SCNC: 103 MMOL/L (ref 100–108)
CO2 SERPL-SCNC: 26 MMOL/L (ref 21–32)
CREAT SERPL-MCNC: 0.82 MG/DL (ref 0.6–1.3)
EOSINOPHIL # BLD AUTO: 0.34 THOUSAND/ΜL (ref 0–0.61)
EOSINOPHIL NFR BLD AUTO: 6 % (ref 0–6)
ERYTHROCYTE [DISTWIDTH] IN BLOOD BY AUTOMATED COUNT: 12.8 % (ref 11.6–15.1)
GFR SERPL CREATININE-BSD FRML MDRD: 90 ML/MIN/1.73SQ M
GLUCOSE SERPL-MCNC: 109 MG/DL (ref 65–140)
HCT VFR BLD AUTO: 41.4 % (ref 36.5–49.3)
HGB BLD-MCNC: 13.6 G/DL (ref 12–17)
IMM GRANULOCYTES # BLD AUTO: 0.01 THOUSAND/UL (ref 0–0.2)
IMM GRANULOCYTES NFR BLD AUTO: 0 % (ref 0–2)
LYMPHOCYTES # BLD AUTO: 1.36 THOUSANDS/ΜL (ref 0.6–4.47)
LYMPHOCYTES NFR BLD AUTO: 24 % (ref 14–44)
MCH RBC QN AUTO: 31 PG (ref 26.8–34.3)
MCHC RBC AUTO-ENTMCNC: 32.9 G/DL (ref 31.4–37.4)
MCV RBC AUTO: 94 FL (ref 82–98)
MONOCYTES # BLD AUTO: 0.43 THOUSAND/ΜL (ref 0.17–1.22)
MONOCYTES NFR BLD AUTO: 7 % (ref 4–12)
NEUTROPHILS # BLD AUTO: 3.59 THOUSANDS/ΜL (ref 1.85–7.62)
NEUTS SEG NFR BLD AUTO: 62 % (ref 43–75)
NRBC BLD AUTO-RTO: 0 /100 WBCS
PLATELET # BLD AUTO: 360 THOUSANDS/UL (ref 149–390)
PMV BLD AUTO: 8.6 FL (ref 8.9–12.7)
POTASSIUM SERPL-SCNC: 4.3 MMOL/L (ref 3.5–5.3)
RBC # BLD AUTO: 4.39 MILLION/UL (ref 3.88–5.62)
SODIUM SERPL-SCNC: 134 MMOL/L (ref 136–145)
WBC # BLD AUTO: 5.78 THOUSAND/UL (ref 4.31–10.16)

## 2021-07-22 PROCEDURE — 99283 EMERGENCY DEPT VISIT LOW MDM: CPT

## 2021-07-22 PROCEDURE — 1123F ACP DISCUSS/DSCN MKR DOCD: CPT | Performed by: EMERGENCY MEDICINE

## 2021-07-22 PROCEDURE — 93971 EXTREMITY STUDY: CPT

## 2021-07-22 PROCEDURE — 99024 POSTOP FOLLOW-UP VISIT: CPT | Performed by: ORTHOPAEDIC SURGERY

## 2021-07-22 PROCEDURE — 96372 THER/PROPH/DIAG INJ SC/IM: CPT

## 2021-07-22 PROCEDURE — 99284 EMERGENCY DEPT VISIT MOD MDM: CPT | Performed by: EMERGENCY MEDICINE

## 2021-07-22 PROCEDURE — 36415 COLL VENOUS BLD VENIPUNCTURE: CPT | Performed by: EMERGENCY MEDICINE

## 2021-07-22 PROCEDURE — 80048 BASIC METABOLIC PNL TOTAL CA: CPT | Performed by: EMERGENCY MEDICINE

## 2021-07-22 PROCEDURE — 93971 EXTREMITY STUDY: CPT | Performed by: SURGERY

## 2021-07-22 PROCEDURE — 85025 COMPLETE CBC W/AUTO DIFF WBC: CPT | Performed by: EMERGENCY MEDICINE

## 2021-07-22 RX ADMIN — ENOXAPARIN SODIUM 80 MG: 80 INJECTION SUBCUTANEOUS at 14:37

## 2021-07-22 NOTE — PROGRESS NOTES
In-basket notification that patient was in the ED at [facility]  Patient was seen for DVT  Patient was on prophylacticc Lovenox as directed  Prescription was completed patient will be restarted on Lovemox 80 mg sub Q every 12 hours  Patient was discharged to home

## 2021-07-22 NOTE — ED PROVIDER NOTES
History  Chief Complaint   Patient presents with    Medical Problem     pt comes to ed sent by ortho for + DVT in RLE s/p orif R calcaneous      This is a 80 y o  old male who presents to the ED for evaluation of DVT  Patient had outpatient duplex positive for DVT in the right lower extremity  History of a lower leg fracture as well as a calcaneal fracture status post repair  He has been on prophylactic Lovenox which he states he has been taking to last 4 days when he stopped  No chest pain or shortness of breath  Has had leg swelling over the last week or 2  No weeping or drainage of the incisions  They are healing  Pain is improving  No fever chills  No cough congestion or runny nose  No abdominal pain, nausea, vomiting, diarrhea, constipation  He has no bruising  No headache no evidence of bleeding  Melena or hematochezia  Prior to Admission Medications   Prescriptions Last Dose Informant Patient Reported?  Taking?   acetaminophen (TYLENOL) 325 mg tablet   No No   Sig: Take 3 tablets (975 mg total) by mouth every 8 (eight) hours   Patient not taking: Reported on 7/22/2021   enoxaparin (LOVENOX) 40 mg/0 4 mL   No No   Sig: Inject 0 4 mL (40 mg total) under the skin daily for 28 days   gabapentin (NEURONTIN) 100 mg capsule   No No   Sig: Take 1 capsule (100 mg total) by mouth 3 (three) times a day for 10 days   methocarbamol (ROBAXIN) 500 mg tablet   No No   Sig: Take 1 tablet (500 mg total) by mouth 4 (four) times a day for 10 days   omeprazole (PriLOSEC) 20 mg delayed release capsule   Yes No   Sig: Take 20 mg by mouth daily   oxyCODONE (ROXICODONE) 5 mg immediate release tablet   No No   Sig: Take 1 tablet (5 mg total) by mouth every 4 (four) hours as needed for moderate painMax Daily Amount: 30 mg   oxyCODONE (ROXICODONE) 5 mg immediate release tablet   No No   Sig: Take 1 tablet (5 mg total) by mouth every 4 (four) hours as needed for moderate painMax Daily Amount: 30 mg   senna (SENOKOT) 8 6 mg   No No   Sig: Take 2 tablets (17 2 mg total) by mouth daily      Facility-Administered Medications: None     No past medical history on file  Past Surgical History:   Procedure Laterality Date    ORIF TIBIA FRACTURE Right 6/1/2021    Procedure: OPEN REDUCTION W/ INTERNAL FIXATION (ORIF) TIBIA;  Surgeon: Emmett Rose MD;  [facility]: [facility];  Service: Orthopedics    PA OPEN TREATMENT CALCANEAL FRACTURE Right 7/1/2021    Procedure: OPEN REDUCTION W/ INTERNAL FIXATION (ORIF) RIGHT CALCANEUS FRACTURE;  Surgeon: Flaca Sibley MD;  Location: [facility];  Service: Orthopedics     No family history on file  I have reviewed and agree with the history as documented  E-Cigarette/Vaping    E-Cigarette Use Never User      E-Cigarette/Vaping Substances    Nicotine No     THC No     CBD No     Flavoring No     Other No     Unknown No      Social History     Tobacco Use    Smoking status: Never Smoker    Smokeless tobacco: Never Used   Vaping Use    Vaping Use: Never used   Substance Use Topics    Alcohol use: Never    Drug use: Never     Review of Systems   Constitutional: Negative for chills, fatigue, fever and unexpected weight change  HENT: Negative for congestion, rhinorrhea and sore throat  Eyes: Negative for redness and visual disturbance  Respiratory: Negative for cough and shortness of breath  Cardiovascular: Positive for leg swelling  Negative for chest pain  Gastrointestinal: Negative for abdominal pain, constipation, diarrhea, nausea and vomiting  Endocrine: Negative for cold intolerance and heat intolerance  Genitourinary: Negative for dysuria, frequency and urgency  Musculoskeletal: Negative for back pain  Skin: Negative for rash  Neurological: Negative for dizziness, syncope and numbness  All other systems reviewed and are negative  Physical Exam  Physical Exam  Vitals and nursing note reviewed     Constitutional:       General: He is not in acute distress  Appearance: He is well-developed  He is not diaphoretic  HENT:      Head: Normocephalic and atraumatic  Right Ear: External ear normal       Left Ear: External ear normal       Nose: Nose normal       Mouth/Throat:      Mouth: Mucous membranes are moist    Eyes:      Conjunctiva/sclera: Conjunctivae normal       Pupils: Pupils are equal, round, and reactive to light  Cardiovascular:      Rate and Rhythm: Normal rate and regular rhythm  Heart sounds: Normal heart sounds  No murmur heard  No gallop  Pulmonary:      Effort: Pulmonary effort is normal  No respiratory distress  Breath sounds: Normal breath sounds  No wheezing or rales  Abdominal:      General: Bowel sounds are normal  There is no distension  Palpations: Abdomen is soft  There is no mass  Tenderness: There is no abdominal tenderness  There is no guarding or rebound  Musculoskeletal:         General: No deformity  Normal range of motion  Cervical back: Normal range of motion and neck supple  Right lower leg: Edema present  Comments: Healing incisions for the RLE  Lymphadenopathy:      Cervical: No cervical adenopathy  Skin:     General: Skin is warm and dry  Findings: No erythema or rash  Neurological:      Mental Status: He is alert and oriented to person, place, and time  Cranial Nerves: No cranial nerve deficit         Vital Signs  ED Triage Vitals   Temperature Pulse Respirations Blood Pressure SpO2   07/22/21 1055 07/22/21 1055 07/22/21 1055 07/22/21 1055 07/22/21 1055   98 °F (36 7 °C) 79 18 112/73 98 %      Temp Source Heart Rate Source Patient Position - Orthostatic VS BP Location FiO2 (%)   07/22/21 1055 07/22/21 1201 07/22/21 1201 07/22/21 1201 --   Oral Monitor Sitting Right arm       Pain Score       07/22/21 1201       No Pain         ED Medications  Medications   enoxaparin (LOVENOX) subcutaneous injection 80 mg (has no administration in time range) Diagnostic Studies  Results Reviewed     Procedure Component Value Units Date/Time    Basic metabolic panel [[id number]]  (Abnormal) Collected: 07/22/21 1212    Lab Status: Final result Specimen: Blood from Arm, Right Updated: 07/22/21 1302     Sodium 134 mmol/L      Potassium 4 3 mmol/L      Chloride 103 mmol/L      CO2 26 mmol/L      ANION GAP 5 mmol/L      BUN 19 mg/dL      Creatinine 0 82 mg/dL      Glucose 109 mg/dL      Calcium 9 8 mg/dL      eGFR 90 ml/min/1 73sq m     Narrative:      National Kidney Disease Foundation guidelines for Chronic Kidney Disease (CKD):     Stage 1 with normal or high GFR (GFR > 90 mL/min/1 73 square meters)    Stage 2 Mild CKD (GFR = 60-89 mL/min/1 73 square meters)    Stage 3A Moderate CKD (GFR = 45-59 mL/min/1 73 square meters)    Stage 3B Moderate CKD (GFR = 30-44 mL/min/1 73 square meters)    Stage 4 Severe CKD (GFR = 15-29 mL/min/1 73 square meters)    Stage 5 End Stage CKD (GFR <15 mL/min/1 73 square meters)  Note: GFR calculation is accurate only with a steady state creatinine    CBC and differential [[id number]]  (Abnormal) Collected: 07/22/21 1212    Lab Status: Final result Specimen: Blood from Arm, Right Updated: 07/22/21 1250     WBC 5 78 Thousand/uL      RBC 4 39 Million/uL      Hemoglobin 13 6 g/dL      Hematocrit 41 4 %      MCV 94 fL      MCH 31 0 pg      MCHC 32 9 g/dL      RDW 12 8 %      MPV 8 6 fL      Platelets 387 Thousands/uL      nRBC 0 /100 WBCs      Neutrophils Relative 62 %      Immat GRANS % 0 %      Lymphocytes Relative 24 %      Monocytes Relative 7 %      Eosinophils Relative 6 %      Basophils Relative 1 %      Neutrophils Absolute 3 59 Thousands/µL      Immature Grans Absolute 0 01 Thousand/uL      Lymphocytes Absolute 1 36 Thousands/µL      Monocytes Absolute 0 43 Thousand/µL      Eosinophils Absolute 0 34 Thousand/µL      Basophils Absolute 0 05 Thousands/µL         No orders to display     Procedures  Procedures    ED Course      A/P: This is a 80 y o  male who presents to the ED for evaluation of leg swelling  Duplex shows DVT  I discussed case with ortho attending, will initiate lovenox, 1mg/kg BID  He can follow up as scheduled as OP  Encouraged patient to continue anticoagulation due to risk of clot propogation  Check basic labs to rule out HAYDER, thrombocytopenia that may alter treatment plan  MDM    Disposition  Final diagnoses:   Postoperative deep vein thrombosis, initial encounter [facility])     Time reflects when diagnosis was documented in both MDM as applicable and the Disposition within this note     Time User Action Codes Description Comment    7/22/2021 12:24 PM Ulysses Thacker Add [M26 [id number],  I82 [id number]] Postoperative deep vein thrombosis, initial encounter [facility])       ED Disposition     ED Disposition Condition Date/Time Comment    Discharge Stable Thu Jul 22, 2021 12:24 PM Hayley Kaye discharge to home/self care  Follow-up Information     Follow up With Specialties Details Why Contact Info    Glenn Randolph MD Orthopedic Surgery Go to  as scheduled [address] [place] [address]  Schedule an appointment as soon as possible for a visit in 1 week Follow up of DVT [address]          Patient's Medications   Discharge Prescriptions    ENOXAPARIN (LOVENOX) 80 MG/0 8 ML    Inject 0 8 mL (80 mg total) under the skin every 12 (twelve) hours       Start Date: 7/22/2021 End Date: 8/21/2021       Order Dose: 80 mg       Quantity: 48 mL    Refills: 0     No discharge procedures on file      PDMP Review       Value Time User    PDMP Reviewed  Yes 7/14/2021  8:54 AM Mara Nevarez PA-C          ED Provider  Electronically Signed by           Ashley Fenton MD  07/22/21 0915

## 2021-07-22 NOTE — PROGRESS NOTES
Orthopaedics Office Visit - Post-op Patient Visit    ASSESSMENT/PLAN:    Assessment:   7 weeks status post ORIF right tibia  DOS 6/1/21  3 weeks status post ORIF right calcaneus fracture  DOS 07/01/2021  Wound healing appropriately,  No concerns for wound dehiscence at this time  Increased calf pain   NON-COMPLIANT WITH ANTICOAGULATION     After patient visit, obtained stat doppler of operative leg, DVT found and sent to ER to initiate therapeutic dose lovenox      Plan:   - continue nonweightbearing on the right lower extremity   - Over the counter analgesics as needed / directed   - Ice / heat as directed   - stressed importance of anticoagulation patient at length  - sutures removed in office  Patient tolerated well  - stat venous ultrasound ordered for the right lower extremity to evaluate for DVT - see above  - Follow up 3 weeks with repeat XR       To Do Next Visit:  XR right calcaneus     _____________________________________________________  CHIEF COMPLAINT:  Chief Complaint   Patient presents with    Right Foot - Post-op         SUBJECTIVE:  Vonda Steward is a 80 y o  male who presents  7 weeks status post ORIF right tibia  DOS 6/1/21, 3 weeks status post ORIF right calcaneus fracture  DOS 07/01/2021 presents to the office for suture removal   Patient states that his calcaneus is doing well overall  Patient states that his main concern is new calf pain which has been ongoing for the past 1 week in duration  Patient denies any numbness or tingling in his foot  Patient denies any color change in leg  Patient states that he discontinued his Lovenox shots last week  Patient has been compliant wuth  nonweightbearing restriction  Patient states he takes oxycodone as needed for pain  Patient offers no other complaints at this time      SOCIAL HISTORY:  Social History     Tobacco Use    Smoking status: Never Smoker    Smokeless tobacco: Never Used   Vaping Use    Vaping Use: Never used   Substance Use Topics    Alcohol use: Never    Drug use: Never       MEDICATIONS:    Current Outpatient Medications:     enoxaparin (LOVENOX) 40 mg/0 4 mL, Inject 0 4 mL (40 mg total) under the skin daily for 28 days, Disp: 11 2 mL, Rfl: 0    omeprazole (PriLOSEC) 20 mg delayed release capsule, Take 20 mg by mouth daily, Disp: , Rfl:     oxyCODONE (ROXICODONE) 5 mg immediate release tablet, Take 1 tablet (5 mg total) by mouth every 4 (four) hours as needed for moderate painMax Daily Amount: 30 mg, Disp: 30 tablet, Rfl: 0    oxyCODONE (ROXICODONE) 5 mg immediate release tablet, Take 1 tablet (5 mg total) by mouth every 4 (four) hours as needed for moderate painMax Daily Amount: 30 mg, Disp: 30 tablet, Rfl: 0    senna (SENOKOT) 8 6 mg, Take 2 tablets (17 2 mg total) by mouth daily, Disp: 10 tablet, Rfl: 0    acetaminophen (TYLENOL) 325 mg tablet, Take 3 tablets (975 mg total) by mouth every 8 (eight) hours (Patient not taking: Reported on 7/22/2021), Disp: 30 tablet, Rfl: 0    gabapentin (NEURONTIN) 100 mg capsule, Take 1 capsule (100 mg total) by mouth 3 (three) times a day for 10 days, Disp: 30 capsule, Rfl: 0    methocarbamol (ROBAXIN) 500 mg tablet, Take 1 tablet (500 mg total) by mouth 4 (four) times a day for 10 days, Disp: 40 tablet, Rfl: 0    REVIEW OF SYSTEMS:  MSK: right heel pain   Neuro: WNL   Pertinent items are otherwise noted in HPI  A comprehensive review of systems was otherwise negative     _____________________________________________________  PHYSICAL EXAMINATION:  Vital signs: /62   Pulse 86   Ht 5' 11" (1 803 m)   Wt 82 6 kg (182 lb)   BMI 25 38 kg/m²   General: No acute distress, awake and alert  Psychiatric: Mood and affect appear appropriate  HEENT: Trachea Midline, No torticollis, no apparent facial trauma  Cardiovascular: No audible murmurs;  Extremities appear perfused  Pulmonary: No audible wheezing or stridor  Skin: No open lesions; see further details (if any) below      MUSCULOSKELETAL EXAMINATION:   right foot examination:  - Patient sitting comfortably in the office in no acute distress   -  Healing incision site with no surrounding erythema or ecchymosis present  Sutures intact with no obvious drainage present  -   Tenderness palpation noted over incision site  Tenderness palpation noted throughout the right calf  Minimal  Edema present in the lower extremity   - NV intact    _____________________________________________________  STUDIES REVIEWED:  I personally reviewed the images and interpretation is as follows: no images obtained today      PROCEDURES PERFORMED:  Suture removal    Date/Time: 7/22/2021 8:51 AM  Performed by: Good Lozano MD  Authorized by: Good Lozano MD   Universal Protocol:  Consent: Verbal consent obtained  Risks and benefits: risks, benefits and alternatives were discussed  Consent given by: patient  Patient understanding: patient states understanding of the procedure being performed  Site marked: the operative site was marked  Patient identity confirmed: verbally with patient        Patient location:  Bedside  Location:     Laterality:  Right    Location:  Lower extremity    Lower extremity location:  Foot    Foot location:  R foot  Procedure details: Tools used:  Suture removal kit    Wound appearance:  No sign(s) of infection, good wound healing and clean  Post-procedure details:     Post-removal:  Steri-Strips applied    Patient tolerance of procedure:   Tolerated well, no immediate complications          Wilmer Alexander PA-C - assisting    Good Lozano MD

## 2021-07-23 ENCOUNTER — PATIENT OUTREACH (OUTPATIENT)
Dept: CASE MANAGEMENT | Facility: OTHER | Age: 71
End: 2021-07-23

## 2021-07-23 NOTE — PROGRESS NOTES
Outreach TC to patient for CM assessment  Patient reports that the pain in his R calf is decreased and the calf muscle is softer  Patient reports compliance with Lovenox 80mg subcutaneous every 12 hours  Patient educated on s/x of PE and when to seek emergent help  Patient remains non-weight bearing to his RLE  Patient report that orthopedic pain is minimal and mostly noted when he tries to sleep  Patient is using minimal pain medication at HS  Reviewed home medications, s/sx to report, future appointments and how/when to report symptoms to provider vs 911  Patient verbalizes understanding and agrees to additional calls

## 2021-08-11 ENCOUNTER — TELEPHONE (OUTPATIENT)
Dept: OBGYN CLINIC | Facility: HOSPITAL | Age: 71
End: 2021-08-11

## 2021-08-11 DIAGNOSIS — I82.409: ICD-10-CM

## 2021-08-11 DIAGNOSIS — T81.89XA: ICD-10-CM

## 2021-08-11 NOTE — TELEPHONE ENCOUNTER
PO DR Destiney George  RE: Refill    Patient asked if Dr Destiney George can refill LOVENOX prescribed in the hospital    enoxaparin (LOVENOX) 80 mg/0 8 mL [[id number]]     Order Details  Dose: 80 mg Route: Subcutaneous Frequency: Every 12 hours   Dispense Quantity: 48 mL Refills: 0          Sig: Inject 0 8 mL (80 mg total) under the skin every 12 (twelve) hours         Start Date: 07/22/21 End Date: 08/21/21 after 60 doses     [address] [facility]  [phone]

## 2021-08-11 NOTE — TELEPHONE ENCOUNTER
Spoke to patient  He stated he is taking the medication as prescribed  He has 2 days worth left but that is because when they [facility] they could not afford the copay for the full amount of the medication so they paid for half and now are in need of getting the other half  Asking for the other half of  this medication to be sent to [facility] on file  Please advise

## 2021-08-12 NOTE — TELEPHONE ENCOUNTER
Patient is calling back to check on the status of the medication request   I called the clinical line and Brissa Morgan stated Dr Jammie Caballero is still in and she'll let him know to req the med today  Please call patient      Callback AD#[phone]

## 2021-08-16 ENCOUNTER — PATIENT OUTREACH (OUTPATIENT)
Dept: CASE MANAGEMENT | Facility: OTHER | Age: 71
End: 2021-08-16

## 2021-08-16 NOTE — PROGRESS NOTES
Outreach TC to patient for RN care   Patient reports that he is feeling well  Patient has no c/o pain except some ocasional stinging sensation in R heel when patient is doing his exercises  Patient remains non-weight bearing and is using a knee scooter to help him ambulate  No s/sx of DVt or PE  Reviewed home medications, s/sx to report, future appointments and how/when to report symptoms to provider vs 911  Patient verbalizing understanding and agrees to additional calls

## 2021-08-26 ENCOUNTER — OFFICE VISIT (OUTPATIENT)
Dept: OBGYN CLINIC | Facility: HOSPITAL | Age: 71
End: 2021-08-26

## 2021-08-26 ENCOUNTER — HOSPITAL ENCOUNTER (OUTPATIENT)
Dept: RADIOLOGY | Facility: HOSPITAL | Age: 71
Discharge: HOME | End: 2021-08-26
Payer: MEDICARE

## 2021-08-26 VITALS
DIASTOLIC BLOOD PRESSURE: 73 MMHG | SYSTOLIC BLOOD PRESSURE: 118 MMHG | HEART RATE: 78 BPM | BODY MASS INDEX: 25.52 KG/M2 | HEIGHT: 71 IN

## 2021-08-26 DIAGNOSIS — Z48.89 AFTERCARE FOLLOWING SURGERY: ICD-10-CM

## 2021-08-26 DIAGNOSIS — Z87.81 STATUS POST OPEN REDUCTION AND INTERNAL FIXATION (ORIF) OF FRACTURE: ICD-10-CM

## 2021-08-26 DIAGNOSIS — Z48.89 AFTERCARE FOLLOWING SURGERY: Primary | ICD-10-CM

## 2021-08-26 DIAGNOSIS — Z98.890 STATUS POST OPEN REDUCTION AND INTERNAL FIXATION (ORIF) OF FRACTURE: ICD-10-CM

## 2021-08-26 PROCEDURE — 73590 X-RAY EXAM OF LOWER LEG: CPT

## 2021-08-26 PROCEDURE — 73650 X-RAY EXAM OF HEEL: CPT

## 2021-08-26 PROCEDURE — 99024 POSTOP FOLLOW-UP VISIT: CPT | Performed by: PHYSICIAN ASSISTANT

## 2021-08-26 NOTE — PROGRESS NOTES
Assessment:    Almost 3 months s/p right tibia done 6/1/2021 (Dr Marin Valenzuela)  S/p ORIF right calcaneous fracture done by Dr Annette Zuleta 7/1/2021  Overall patient is doing well with minimal pain in the right lower extremity  X-rays remain stable  Plan:    Remain NWB RLE  OTC analgesics PRN  Therapeutic Lovenox-   We have recommended patient call PCP to assist in treatment timeline for acute DVT  Continue home ankle range of motion exercises  Elevation/Ice for swelling  Follow-up 4 weeks for re-evaluation and new x-rays of the right tibia and right calcaneus  Patient will now follow-up with Dr Annette Zuleta for both injuries in light of Dr Marin Valenzuela leaving the network  We will likely initiate outpatient physical therapy once weight-bearing status is transitioned  Problem List Items Addressed This Visit        Other    Status post open reduction and internal fixation (ORIF) of fracture (R distal Tibia)    Aftercare following surgery - Primary    Relevant Orders    XR tibia fibula 2 vw right                   Subjective:     Patient ID:  Belem Ward is a 80 y o  male    HPI    Almost 3 months s/p right tibia done 6/1/2021 (Dr Marin Valenzuela)  Today, the patient states overall he has minimal pain in the right lower extremity  He has been compliant with nonweightbearing status  He has been doing home ankle range of motion exercises  He denies any numbness and tingling in the right lower extremity  He offers no acute complaints today  Chart reviewed and patient had a recent DVT RLE, now being treated with therapeutic dose of Lovenox  Of note, the patient is s/p ORIF right calcaneous fracture done by Dr Annette Zuleta 7/1/2021            The following portions of the patient's history were reviewed and updated as appropriate: allergies, current medications, past family history, past medical history, past social history, past surgical history and problem list     Review of Systems     Objective:    Imaging:  Right tibia x-rays performed today demonstrate  Visible fracture lines with intact hardware, in unchanged position, stable alignment  Right calcaneous x-rays performed today demonstrate  Stable alignment of calcaneus fracture with hardware intact, in unchanged alignment  Vitals:    08/26/21 0809   BP: 118/73   Pulse: 78         Physical Exam     No acute distress  Patient examined in wheelchair   Right lower extremity:  All incisions are healed    5/5 strength right hip flexion knee extension, ankle plantar dorsiflexion EHL FHL   Sensation intact  Trace pitting edema, no calf tenderness

## 2021-08-31 ENCOUNTER — PATIENT OUTREACH (OUTPATIENT)
Dept: CASE MANAGEMENT | Facility: OTHER | Age: 71
End: 2021-08-31

## 2021-08-31 NOTE — PROGRESS NOTES
Outreach T to patient for Rn care   Patient was seen by orthopedics (PA for Dr Lynn Lincoln) regarding tib/fib fracture  Fracture site is healing well  Patient remains NWB secondary to R calcaneous fracture  Patient will see Dr Brian Hardin regarding calcaneous on 9/29/21  Patient saw PCP today (8/31/21) and Lovenox 80 mg subq every 12 hours has been continued x 60 additional days  Patient was recently diagnosed with DVT post 2 surgeries and being NWB  We reviewed home meications, s/sx to report, future appointments and how/when to report symptoms to provider vs 911  Patient verbalizes understanding  BPCI episode end  Resolved episode, removed self from car team and updated care coordination note

## 2021-09-10 ENCOUNTER — HOSPITAL ENCOUNTER (OUTPATIENT)
Dept: RADIOLOGY | Facility: HOSPITAL | Age: 71
Discharge: HOME | End: 2021-09-10
Payer: MEDICARE

## 2021-09-10 DIAGNOSIS — S46.011A STRAIN OF MUSCLE(S) AND TENDON(S) OF THE ROTATOR CUFF OF RIGHT SHOULDER, INITIAL ENCOUNTER: ICD-10-CM

## 2021-09-10 PROCEDURE — 73221 MRI JOINT UPR EXTREM W/O DYE: CPT

## 2021-09-13 ENCOUNTER — HOSPITAL ENCOUNTER (OUTPATIENT)
Dept: NON INVASIVE DIAGNOSTICS | Facility: HOSPITAL | Age: 71
Discharge: HOME | End: 2021-09-13
Payer: MEDICARE

## 2021-09-13 DIAGNOSIS — I82.409 ACUTE EMBOLISM AND THROMBOSIS OF UNSPECIFIED DEEP VEINS OF UNSPECIFIED LOWER EXTREMITY (HCC): ICD-10-CM

## 2021-09-13 PROCEDURE — 93971 EXTREMITY STUDY: CPT | Performed by: SURGERY

## 2021-09-13 PROCEDURE — 93971 EXTREMITY STUDY: CPT

## 2021-09-15 ENCOUNTER — TELEPHONE (OUTPATIENT)
Dept: HEMATOLOGY ONCOLOGY | Facility: CLINIC | Age: 71
End: 2021-09-15

## 2021-09-15 NOTE — TELEPHONE ENCOUNTER
New Patient Request   Patient Details:     Haris Agarwal      1950      [id number]      Reason for Appointment   Who is calling to schedule? patient   If not Patient, what is their name? What is the diagnosis? Blood clot right calf   Who is the referring doctor? Dr Romelia Wallace are you scheduling with ? Hem onc   Preferred [facility]   Best Number to call back on? If calling from the [facility], use the Nurse number   [phone]   Miscellaneous Information:     Please advise the patient, a new patient  will be calling them back within 1 business day

## 2021-09-16 ENCOUNTER — TELEPHONE (OUTPATIENT)
Dept: HEMATOLOGY ONCOLOGY | Facility: CLINIC | Age: 71
End: 2021-09-16

## 2021-09-16 NOTE — TELEPHONE ENCOUNTER
New Patient Encounter    New Patient Intake Form   Patient Details:  Kristin Solis  1950  [id number]    Background Information:  [address] starts by opening a telephone encounter and gathering the following information   Who is calling to schedule? If not self, relationship to patient? Patient   Referring Provider Dr Parrish Whelan   What is the diagnosis? DVT   Is this Cancer or Non-Cancer? Non-Cancer   Is this diagnosis confirmed? Yes   When was the diagnosis? 9/2021   Is there a confirmed diagnosis from a biopsy/tissue reviewed by pathology? NA   Were outside slides requested? NA   Is patient aware of diagnosis? Yes   Is there a personal history and what kind? No   Is there a family history and what kind? No   Reason for visit? New Diagnosis   Have you had any imaging or labs done? If so: when, where? yes  [facility]   Are records in [facility]? yes   Are records needed form an outside facility? No   If yes, name, city, state where facility is located  na   If patient has a prior history of cancer were old records obtained? NA   Was the patient told to bring a disk? No   Does the patient smoke or Vape? No   If yes, how many packs or cartridges per day? Scheduling Information:   Preferred [place]:  [facility]     Are there any dates/time the patient cannot be seen?     Miscellaneous:

## 2021-09-22 ENCOUNTER — CONSULT (OUTPATIENT)
Dept: HEMATOLOGY ONCOLOGY | Facility: CLINIC | Age: 71
End: 2021-09-22
Payer: MEDICARE

## 2021-09-22 VITALS
WEIGHT: 196 LBS | SYSTOLIC BLOOD PRESSURE: 116 MMHG | TEMPERATURE: 97.5 F | HEART RATE: 71 BPM | DIASTOLIC BLOOD PRESSURE: 64 MMHG | OXYGEN SATURATION: 97 % | HEIGHT: 71 IN | BODY MASS INDEX: 27.44 KG/M2 | RESPIRATION RATE: 16 BRPM

## 2021-09-22 DIAGNOSIS — Z87.81 S/P ORIF (OPEN REDUCTION INTERNAL FIXATION) FRACTURE: ICD-10-CM

## 2021-09-22 DIAGNOSIS — Z98.890 S/P ORIF (OPEN REDUCTION INTERNAL FIXATION) FRACTURE: ICD-10-CM

## 2021-09-22 DIAGNOSIS — Z79.01 ANTICOAGULATED: ICD-10-CM

## 2021-09-22 DIAGNOSIS — I82.551 CHRONIC DEEP VEIN THROMBOSIS (DVT) OF RIGHT PERONEAL VEIN (HCC): Primary | ICD-10-CM

## 2021-09-22 PROBLEM — I82.509 CHRONIC DEEP VEIN THROMBOSIS (DVT) (HCC): Status: ACTIVE | Noted: 2021-09-22

## 2021-09-22 PROCEDURE — 99204 OFFICE O/P NEW MOD 45 MIN: CPT | Performed by: NURSE PRACTITIONER

## 2021-09-22 NOTE — LETTER
September 22, 2021     Asha Arias, [address] [facility] [address]    Patient: Gloria Shabazz   YOB: 1950   Date of Visit: 9/22/2021       Dear Dr Danyell Pacheco:    Thank you for referring Vanessa Desai to me for evaluation  Below are the relevant portions of my assessment and plan of care  If you have questions, please do not hesitate to call me  I look forward to following Mike Dyer along with you           Sincerely,        JAMES Benites        CC: Jarrett Mcfarlane MD

## 2021-09-22 NOTE — PATIENT INSTRUCTIONS
Discontinue Lovenox now  Start taking Eliquis 5 mg by mouth twice daily 12 hours apart  Continue Eliquis until January 31st 2022  Then stop taking Eliquis, February 1st start taking a baby aspirin 81 mg once daily until February 4, 2022  Have blood work drawn on February 4th and restart Eliquis after blood work is drawn  Continue on Eliquis until follow-up appointment

## 2021-09-22 NOTE — PROGRESS NOTES
[address] [facility] [place]  [address] [facility] [id number] [phone]  Hematology Ambulatory Consult  Lynn Salmeron, 1950, [id number]  9/22/2021    Assessment/Plan:  1  Chronic deep vein thrombosis (DVT) of right peroneal vein (HCC)  2  S/P ORIF (open reduction internal fixation) fracture (R Calcaneus  3  Anticoagulated   Patient is a 80-year-old male with no significant medical history who sustained a traumatic injury after falling from a roof on 05/31/2021  He had a mildly displaced comminuted right calcaneus fracture and a right tib-fib closed fracture that required an ORIF on 06/01/2021  Patient was started on Lovenox while in the hospital and expected to continue for 28 days  Patient then underwent a 2nd surgery on 07/01/2021 for the displaced fracture of the right calcaneus fracture  Patient was expected to continue Lovenox for a total of 6 weeks  He was seen by Ortho on 07/22/2021 for a postop visit with complaints of new right calf pain for approximately 1 week  Patient was sent for a venous Doppler of the right lower extremity and found to have an acute occlusive DVT in the distal popliteal vein and both paired post tibial veins from proximal to distal calf  Per ortho note on 07/22/2021patient stated he had discontinued his Lovenox injections 1 week prior  Patient was restarted on Lovenox 1 milligram/kilogram (80mg) subcu b i d  this was planned to continue for an additional 60 days  Patient was referred to his PCP in regards to length of anticoagulation however they referred to our practice  Patient states he has been compliant with the Lovenox 80 mg subQ b i d  since he had been diagnosed with the DVT  I suspect patient's DVT is multifactorial including to back to back orthopedic surgical procedures, nonweightbearing status, and patient discontinuing Lovenox to early  I do not believe this is a failure of Lovenox therapy    Patient states he does not have a personal history of previous blood clots  Family history includes his father who had a blood clot in his leg however this was also likely provoked due to a snowmobile accident  He knows his father was on anticoagulation the rest of his life however is not aware of any testing such as factor 5 or lupus anticoagulant  We discussed continuing anticoagulation for at least 6 months of therapy  At this time I am inclined to change to Eliquis 5 mg p o  b i d  for 3 more months  I gave written and verbal instructions to continue Eliquis until January 31, 2022  Then stop Eliquis but take a baby aspirin 81 mg p o  daily  Four days after he stops Eliquis have a thrombosis panel drawn and restart the Eliquis after the blood work is done  I instructed him to continue on Eliquis until we see each other at follow-up visit  Should lupus anticoagulant be positive we would need to switch anticoagulation to Coumadin and he would remain on that the rest of his life  We further discussed if he does not have any risk factors or the testing is normal he should be on prophylactic baby aspirin 81 mg p o  daily  We will make a final decision based on the blood work in 3 months  Patient verbalized understanding and is in agreement with the plan  - Thrombosis Panel; Future  - apixaban (ELIQUIS) 5 mg; Take 1 tablet (5 mg total) by mouth 2 (two) times a day  Dispense: 30 tablet; Refill: 3    I have spent 35 minutes with Patient  today in which greater than 50% of this time was spent in counseling/coordination of care regarding Diagnostic results, Risks and benefits of tx options, Impressions and Indications for further testing with thrombosis panel and need for anticoagulation  Barrier(s) to care: None     The patient is  able to self care     -------------------------------------------------------------------------------------------------------    Chief Complaint   Patient presents with   Heriberto Murray Consult Referring provider:  No referring provider defined for this encounter  History of present illness:  Patient is a 81-year-old male with no significant medical history who sustained a traumatic injury after falling from a roof on 05/31/2021  He had a mildly displaced comminuted right calcaneus fracture and a right tib-fib closed fracture that required an ORIF on 06/01/2021  Patient was started on Lovenox while in the hospital and expected to continue for 28 days  Patient then underwent a 2nd surgery on 07/01/2021 for the displaced fracture of the right calcaneus fracture  Patient was expected to continue Lovenox for a total of 6 weeks  He was seen by Ortho on 07/22/2021 for a postop visit with complaints of new right calf pain for approximately 1 week  Patient was sent for a venous Doppler of the right lower extremity and found to have an acute occlusive DVT in the distal popliteal vein and both paired post tibial veins from proximal to distal calf  Per ortho note on 07/22/2021patient stated he had discontinued his Lovenox injections 1 week prior  Patient was restarted on Lovenox 1 milligram/kilogram (80mg) subcu b i d  this was planned to continue for an additional 60 days  Patient was referred to his PCP in regards to length of anticoagulation however they referred to our practice  Review of Systems   Constitutional: Negative for activity change, appetite change, fatigue, fever and unexpected weight change  Respiratory: Negative for cough and shortness of breath  Cardiovascular: Negative for chest pain and leg swelling  Gastrointestinal: Negative for abdominal pain, constipation, diarrhea and nausea  Endocrine: Negative for cold intolerance and heat intolerance  Musculoskeletal: Negative for arthralgias and myalgias  Skin: Negative  Neurological: Negative for dizziness, weakness and headaches  Hematological: Negative for adenopathy  Does not bruise/bleed easily         Patient Active Problem List   Diagnosis    Gastroesophageal reflux disease    Fall from height of greater than 3 feet    Tibia/fibula fracture, right, closed, initial encounter    Displaced fracture of body of right calcaneus, initial encounter for closed fracture    Acute pain due to trauma    Acute pain of right shoulder    Status post open reduction and internal fixation (ORIF) of fracture (R distal Tibia)    S/P ORIF (open reduction internal fixation) fracture (R Calcaneus    Aftercare following surgery    Chronic deep vein thrombosis (DVT) ([facility] [place] [address] )       History reviewed  No pertinent past medical history  Past Surgical History:   Procedure Laterality Date    ORIF TIBIA FRACTURE Right 6/1/2021    Procedure: OPEN REDUCTION W/ INTERNAL FIXATION (ORIF) TIBIA;  Surgeon: Deanna Mendoza MD;  [facility];  Service: Orthopedics    [place] OPEN TREATMENT CALCANEAL FRACTURE Right 7/1/2021    Procedure: OPEN REDUCTION W/ INTERNAL FIXATION (ORIF) RIGHT CALCANEUS FRACTURE;  Surgeon: Cadence Lundy MD;  [facility];  Service: Orthopedics       History reviewed  No pertinent family history  Social History     Socioeconomic History    Marital status: /Civil Union     Spouse name: None    Number of children: None    Years of education: None    Highest education level: None   Occupational History    None   Tobacco Use    Smoking status: Never Smoker    Smokeless tobacco: Never Used   Vaping Use    Vaping Use: Never used   Substance and Sexual Activity    Alcohol use: Never    Drug use: Never    Sexual activity: None   Other Topics Concern    None   Social History Narrative    None     Social Determinants of Health     Financial Resource Strain:     Difficulty of Paying Living Expenses:    Food Insecurity:     Worried About Running Out of Food in the Last Year:     Ran Out of Food in the Last Year:    Transportation Needs:     Lack of Transportation (Medical):      Lack of Transportation (Non-Medical):    Physical Activity:     Days of Exercise per Week:     Minutes of Exercise per Session:    Stress:     Feeling of Stress :    Social Connections:     Frequency of Communication with Friends and Family:     Frequency of Social Gatherings with Friends and Family:     Attends Sikh Services:     Active Member of Clubs or Organizations:     Attends Club or Organization Meetings:     Marital Status:    Intimate Partner Violence:     Fear of Current or Ex-Partner:     Emotionally Abused:     Physically Abused:     Sexually Abused:          Current Outpatient Medications:     omeprazole (PriLOSEC) 20 mg delayed release capsule, Take 20 mg by mouth daily , Disp: , Rfl:     acetaminophen (TYLENOL) 325 mg tablet, Take 3 tablets (975 mg total) by mouth every 8 (eight) hours (Patient not taking: Reported on 7/22/2021), Disp: 30 tablet, Rfl: 0    apixaban (ELIQUIS) 5 mg, Take 1 tablet (5 mg total) by mouth 2 (two) times a day, Disp: 30 tablet, Rfl: 3    No Known Allergies    Objective:  /64 (BP Location: Left arm, Patient Position: Sitting, Cuff Size: Standard)   Pulse 71   Temp 97 5 °F (36 4 °C) (Temporal)   Resp 16   Ht 5' 11" (1 803 m)   Wt 88 9 kg (196 lb)   SpO2 97%   BMI 27 34 kg/m²   Physical Exam  Vitals reviewed  Constitutional:       Appearance: Normal appearance  He is well-developed  HENT:      Head: Normocephalic and atraumatic  Eyes:      Pupils: Pupils are equal, round, and reactive to light  Cardiovascular:      Rate and Rhythm: Normal rate and regular rhythm  Pulses: Normal pulses  Heart sounds: Normal heart sounds  Pulmonary:      Effort: Pulmonary effort is normal  No respiratory distress  Breath sounds: Normal breath sounds  Abdominal:      General: Bowel sounds are normal       Palpations: Abdomen is soft  Musculoskeletal:         General: Normal range of motion  Cervical back: Normal range of motion  Comments: Right lower extremity in an immobilizer boot   Lymphadenopathy:      Cervical: No cervical adenopathy  Skin:     General: Skin is warm and dry  Capillary Refill: Capillary refill takes less than 2 seconds  Neurological:      Mental Status: He is alert and oriented to person, place, and time  Psychiatric:         Behavior: Behavior normal        Please note: This report has been generated by a voice recognition software system  Therefore there may be syntax, spelling, and/or grammatical errors  Please call if you have any questions

## 2021-09-27 DIAGNOSIS — Z48.89 AFTERCARE FOLLOWING SURGERY: Primary | ICD-10-CM

## 2021-09-29 ENCOUNTER — OFFICE VISIT (OUTPATIENT)
Dept: OBGYN CLINIC | Facility: HOSPITAL | Age: 71
End: 2021-09-29

## 2021-09-29 ENCOUNTER — HOSPITAL ENCOUNTER (OUTPATIENT)
Dept: RADIOLOGY | Facility: HOSPITAL | Age: 71
Discharge: HOME | End: 2021-09-29
Attending: ORTHOPAEDIC SURGERY
Payer: MEDICARE

## 2021-09-29 VITALS
BODY MASS INDEX: 27.44 KG/M2 | SYSTOLIC BLOOD PRESSURE: 113 MMHG | HEIGHT: 71 IN | HEART RATE: 55 BPM | DIASTOLIC BLOOD PRESSURE: 72 MMHG | WEIGHT: 196 LBS

## 2021-09-29 DIAGNOSIS — Z87.81 S/P ORIF (OPEN REDUCTION INTERNAL FIXATION) FRACTURE: Primary | ICD-10-CM

## 2021-09-29 DIAGNOSIS — S92.011S CLOSED DISPLACED FRACTURE OF BODY OF RIGHT CALCANEUS, SEQUELA: ICD-10-CM

## 2021-09-29 DIAGNOSIS — Z98.890 S/P ORIF (OPEN REDUCTION INTERNAL FIXATION) FRACTURE: Primary | ICD-10-CM

## 2021-09-29 DIAGNOSIS — Z48.89 AFTERCARE FOLLOWING SURGERY: ICD-10-CM

## 2021-09-29 PROCEDURE — 99024 POSTOP FOLLOW-UP VISIT: CPT | Performed by: ORTHOPAEDIC SURGERY

## 2021-09-29 PROCEDURE — 73650 X-RAY EXAM OF HEEL: CPT

## 2021-09-29 NOTE — PROGRESS NOTES
Assessment:   Diagnosis ICD-10-CM Associated Orders   1  S/P ORIF (open reduction internal fixation) fracture  Z98 [id number] Ambulatory referral to Physical Therapy    Z87 [id number]    2  Closed displaced fracture of body of right calcaneus, sequela  S92 [id number] Ambulatory referral to Physical Therapy       Plan:  X-rays taken reviewed, physical exam performed  Patient doing well 3 months status post ORIF right calcaneus fracture  At this point we recommend physical therapy  Weightbearing as tolerated and gradual progression as instructed  May transition into normal shoe wear  To do next visit:  Return in about 3 months (around 12/29/2021) for re-check with x-rays right calcaneus   The above stated was discussed in layman's terms and the patient expressed understanding  All questions were answered to the patient's satisfaction  Scribe Attestation    I,:  Idalia Turner am acting as a scribe while in the presence of the attending physician :       I,:  Caridad Porras MD personally performed the services described in this documentation    as scribed in my presence :             Subjective:   Lynn Salmeron is a 80 y o  male who presents repeat evaluation of his right calcaneus, status post ORIF, 07/01/2021  Overall is doing very well  He notes discomfort laterally intermittently but overall is doing well  He has been weight-bearing in his Cam walker boot with a single axillary crutch  He is currently on Eliquis for his DVT  He denies any calf or thigh pain  Denies any fevers chills  Review of systems negative unless otherwise specified in HPI  Review of Systems    History reviewed  No pertinent past medical history      Past Surgical History:   Procedure Laterality Date    ORIF TIBIA FRACTURE Right 6/1/2021    Procedure: OPEN REDUCTION W/ INTERNAL FIXATION (ORIF) TIBIA;  Surgeon: Lowell Hawthorne MD;  [facility];  Service: Orthopedics    [place] OPEN TREATMENT CALCANEAL FRACTURE Right 7/1/2021    Procedure: OPEN REDUCTION W/ INTERNAL FIXATION (ORIF) RIGHT CALCANEUS FRACTURE;  Surgeon: Lkaia Quinones MD;  [facility];  Service: Orthopedics       History reviewed  No pertinent family history  Social History     Occupational History    Not on file   Tobacco Use    Smoking status: Never Smoker    Smokeless tobacco: Never Used   Vaping Use    Vaping Use: Never used   Substance and Sexual Activity    Alcohol use: Never    Drug use: Never    Sexual activity: Not on file         Current Outpatient Medications:     apixaban (ELIQUIS) 5 mg, Take 1 tablet (5 mg total) by mouth 2 (two) times a day, Disp: 30 tablet, Rfl: 3    omeprazole (PriLOSEC) 20 mg delayed release capsule, Take 20 mg by mouth daily , Disp: , Rfl:     acetaminophen (TYLENOL) 325 mg tablet, Take 3 tablets (975 mg total) by mouth every 8 (eight) hours (Patient not taking: Reported on 7/22/2021), Disp: 30 tablet, Rfl: 0    No Known Allergies         Vitals:    09/29/21 0808   BP: 113/72   Pulse: 55       Objective:                    Right Ankle Exam     Tenderness   The patient is experiencing no tenderness  Swelling: mild    Other   Erythema: absent  Sensation: normal     Comments:    Healed incision  Mild restriction of tibiotalar and subtalar joint range of motion  Mild weakness of his ankle dorsiflexors and plantar flexors  No signs of infection  No signs of DVT  Diagnostics, reviewed and taken today if performed as documented: The attending physician has personally reviewed the pertinent films in PACS and interpretation is as follows:    Right calcaneus x-rays taken and reviewed in the office today show:  Instrumented hardware in excellent position alignment, no signs of hardware failure  Healed calcaneus fracture    Limited visualization of distal tibia hardware      Procedures, if performed today:    Procedures    None performed      Portions of the record may have been created with voice recognition software  Occasional wrong word or "sound a like" substitutions may have occurred due to the inherent limitations of voice recognition software  Read the chart carefully and recognize, using context, where substitutions have occurred

## 2021-10-08 ENCOUNTER — EVALUATION (OUTPATIENT)
Dept: PHYSICAL THERAPY | Facility: REHABILITATION | Age: 71
End: 2021-10-08
Payer: MEDICARE

## 2021-10-08 DIAGNOSIS — Z87.81 S/P ORIF (OPEN REDUCTION INTERNAL FIXATION) FRACTURE: ICD-10-CM

## 2021-10-08 DIAGNOSIS — S92.011S CLOSED DISPLACED FRACTURE OF BODY OF RIGHT CALCANEUS, SEQUELA: ICD-10-CM

## 2021-10-08 DIAGNOSIS — Z98.890 S/P ORIF (OPEN REDUCTION INTERNAL FIXATION) FRACTURE: ICD-10-CM

## 2021-10-08 PROCEDURE — 97110 THERAPEUTIC EXERCISES: CPT | Performed by: PHYSICAL THERAPIST

## 2021-10-08 PROCEDURE — 97161 PT EVAL LOW COMPLEX 20 MIN: CPT | Performed by: PHYSICAL THERAPIST

## 2021-10-19 ENCOUNTER — OFFICE VISIT (OUTPATIENT)
Dept: PHYSICAL THERAPY | Facility: REHABILITATION | Age: 71
End: 2021-10-19
Payer: MEDICARE

## 2021-10-19 DIAGNOSIS — S92.011S CLOSED DISPLACED FRACTURE OF BODY OF RIGHT CALCANEUS, SEQUELA: ICD-10-CM

## 2021-10-19 DIAGNOSIS — Z87.81 S/P ORIF (OPEN REDUCTION INTERNAL FIXATION) FRACTURE: Primary | ICD-10-CM

## 2021-10-19 DIAGNOSIS — Z98.890 S/P ORIF (OPEN REDUCTION INTERNAL FIXATION) FRACTURE: Primary | ICD-10-CM

## 2021-10-19 PROCEDURE — 97112 NEUROMUSCULAR REEDUCATION: CPT | Performed by: PHYSICAL THERAPIST

## 2021-10-19 PROCEDURE — 97530 THERAPEUTIC ACTIVITIES: CPT | Performed by: PHYSICAL THERAPIST

## 2021-10-19 PROCEDURE — 97140 MANUAL THERAPY 1/> REGIONS: CPT | Performed by: PHYSICAL THERAPIST

## 2021-10-26 ENCOUNTER — APPOINTMENT (OUTPATIENT)
Dept: PHYSICAL THERAPY | Facility: REHABILITATION | Age: 71
End: 2021-10-26
Payer: MEDICARE

## 2021-11-23 ENCOUNTER — TELEPHONE (OUTPATIENT)
Dept: GYNECOLOGIC ONCOLOGY | Facility: CLINIC | Age: 71
End: 2021-11-23

## 2021-12-09 ENCOUNTER — TELEPHONE (OUTPATIENT)
Dept: HEMATOLOGY ONCOLOGY | Facility: CLINIC | Age: 71
End: 2021-12-09

## 2021-12-26 DIAGNOSIS — Z87.81 S/P ORIF (OPEN REDUCTION INTERNAL FIXATION) FRACTURE: Primary | ICD-10-CM

## 2021-12-26 DIAGNOSIS — Z98.890 S/P ORIF (OPEN REDUCTION INTERNAL FIXATION) FRACTURE: Primary | ICD-10-CM

## 2022-01-28 ENCOUNTER — APPOINTMENT (OUTPATIENT)
Dept: LAB | Facility: HOSPITAL | Age: 72
End: 2022-01-28
Payer: MEDICARE

## 2022-01-28 DIAGNOSIS — I82.551 CHRONIC DEEP VEIN THROMBOSIS (DVT) OF RIGHT PERONEAL VEIN (HCC): ICD-10-CM

## 2022-01-28 DIAGNOSIS — Z98.890 S/P ORIF (OPEN REDUCTION INTERNAL FIXATION) FRACTURE: ICD-10-CM

## 2022-01-28 DIAGNOSIS — Z79.01 ANTICOAGULATED: ICD-10-CM

## 2022-01-28 DIAGNOSIS — Z87.81 S/P ORIF (OPEN REDUCTION INTERNAL FIXATION) FRACTURE: ICD-10-CM

## 2022-01-28 LAB — DEPRECATED AT III PPP: 108 % OF NORMAL (ref 92–136)

## 2022-01-28 PROCEDURE — 85670 THROMBIN TIME PLASMA: CPT

## 2022-01-28 PROCEDURE — 86147 CARDIOLIPIN ANTIBODY EA IG: CPT

## 2022-01-28 PROCEDURE — 85305 CLOT INHIBIT PROT S TOTAL: CPT

## 2022-01-28 PROCEDURE — 36415 COLL VENOUS BLD VENIPUNCTURE: CPT

## 2022-01-28 PROCEDURE — 85613 RUSSELL VIPER VENOM DILUTED: CPT

## 2022-01-28 PROCEDURE — 85300 ANTITHROMBIN III ACTIVITY: CPT

## 2022-01-28 PROCEDURE — 85732 THROMBOPLASTIN TIME PARTIAL: CPT

## 2022-01-28 PROCEDURE — 85306 CLOT INHIBIT PROT S FREE: CPT

## 2022-01-28 PROCEDURE — 85303 CLOT INHIBIT PROT C ACTIVITY: CPT

## 2022-01-28 PROCEDURE — 85705 THROMBOPLASTIN INHIBITION: CPT

## 2022-01-28 PROCEDURE — 86146 BETA-2 GLYCOPROTEIN ANTIBODY: CPT

## 2022-01-30 LAB
APTT SCREEN TO CONFIRM RATIO: 0.97 RATIO (ref 0–1.34)
CONFIRM APTT/NORMAL: 38.5 SEC (ref 0–47.6)
LA PPP-IMP: NORMAL
PROT S ACT/NOR PPP: 88 % (ref 61–136)
PROT S PPP-ACNC: 95 % (ref 60–150)
SCREEN APTT: 30 SEC (ref 0–51.9)
SCREEN DRVVT: 32.7 SEC (ref 0–47)
THROMBIN TIME: 18.6 SEC (ref 0–23)

## 2022-01-31 LAB
PROT C AG ACT/NOR PPP IA: 79 % OF NORMAL (ref 60–150)
PROT S ACT/NOR PPP: 67 % (ref 71–117)

## 2022-02-01 LAB
B2 GLYCOPROT1 IGA SERPL IA-ACNC: 1.6
B2 GLYCOPROT1 IGG SERPL IA-ACNC: 0.8
B2 GLYCOPROT1 IGM SERPL IA-ACNC: <2.9
CARDIOLIPIN IGA SER IA-ACNC: 2.8
CARDIOLIPIN IGG SER IA-ACNC: 1.1
CARDIOLIPIN IGM SER IA-ACNC: 6.6

## 2022-02-04 LAB
F2 GENE MUT ANL BLD/T: NORMAL
PROTHROMBIN G20210A MUTATION INTERPRETATION: NORMAL

## 2022-02-07 LAB — F5 GENE MUT ANL BLD/T: NORMAL

## 2022-02-24 ENCOUNTER — OFFICE VISIT (OUTPATIENT)
Dept: HEMATOLOGY ONCOLOGY | Facility: CLINIC | Age: 72
End: 2022-02-24
Payer: MEDICARE

## 2022-02-24 VITALS
BODY MASS INDEX: 27.16 KG/M2 | OXYGEN SATURATION: 100 % | DIASTOLIC BLOOD PRESSURE: 78 MMHG | HEART RATE: 61 BPM | TEMPERATURE: 97.7 F | RESPIRATION RATE: 16 BRPM | HEIGHT: 71 IN | SYSTOLIC BLOOD PRESSURE: 118 MMHG | WEIGHT: 194 LBS

## 2022-02-24 DIAGNOSIS — I82.551 CHRONIC DEEP VEIN THROMBOSIS (DVT) OF RIGHT PERONEAL VEIN (HCC): Primary | ICD-10-CM

## 2022-02-24 PROCEDURE — 99213 OFFICE O/P EST LOW 20 MIN: CPT | Performed by: NURSE PRACTITIONER

## 2022-02-24 NOTE — LETTER
February 24, 2022     Renee Hillview  [address]    Patient: Jaci Jimenez   YOB: 1950   Date of Visit: 2/24/2022       Dear Dr Klarissa Kwong: Thank you for referring Loly Jessica to me for evaluation  Below are the relevant portions of my assessment and plan of care  If you have questions, please do not hesitate to call me  I look forward to following Abner Schwartz along with you           Sincerely,        JAMES Sparrow        CC: No Recipients

## 2022-02-24 NOTE — PROGRESS NOTES
[address] [facility]AT[facility] [address]  [facility] [id number] [phone]  Hematology Ambulatory [address], 1950, [id number]  2/24/2022    Assessment/Plan:    1  Chronic deep vein thrombosis (DVT) of right peroneal vein [facility])   Patient is a 80-year-old male with a history of DVT provoked after an ORIF of the right tib-fib closed fracture in June 2021  Patient completed a six-month course of Eliquis in January 2022  He started taking a baby aspirin 81 mg p o  once daily and had a thrombosis panel on 01/28/2022  Thrombosis panel was negative for factor 5 Leiden, prothrombin gene mutation, lupus anticoagulant  Everything except protein S activity was within normal limits  Protein S activity was decreased at 67  We discussed he does not need lifelong anticoagulation given the provoked clot and no previous history of thrombosis  I do recommend he continue baby aspirin 81 mg p o  once daily for prophylaxis  Should he ever have a blood clot in the future he will then need lifelong anticoagulation  In that case any of the anticoagulation agents could be a consideration including Coumadin  Patient agreed to continue baby aspirin as recommended for prophylaxis  We will see this patient on a p r n  basis as he does not need to follow with our practice  I recommended he continue following with his PCP for other health maintenance  Patient verbalized understanding and is in agreement with the plan  Barrier(s) to care: None  The patient is able to self care  [address] [facility]    Interval history:  Clinically stable    Review of Systems   Constitutional: Negative for activity change, appetite change, fatigue, fever and unexpected weight change  Respiratory: Negative for cough and shortness of breath  Cardiovascular: Negative for chest pain and leg swelling  Gastrointestinal: Negative for abdominal pain, constipation, diarrhea and nausea  Endocrine: Negative for cold intolerance and heat intolerance  Musculoskeletal: Negative for arthralgias and myalgias  Skin: Negative  Neurological: Negative for dizziness, weakness and headaches  Hematological: Negative for adenopathy  Does not bruise/bleed easily  Patient Active Problem List   Diagnosis    Gastroesophageal reflux disease    Fall from height of greater than 3 feet    Tibia/fibula fracture, right, closed, initial encounter    Displaced fracture of body of right calcaneus, initial encounter for closed fracture    Acute pain due to trauma    Acute pain of right shoulder    Status post open reduction and internal fixation (ORIF) of fracture (R distal Tibia)    S/P ORIF (open reduction internal fixation) fracture (R Calcaneus    Aftercare following surgery    Chronic deep vein thrombosis (DVT) (HCC)       No past medical history on file  Past Surgical History:   Procedure Laterality Date    ORIF TIBIA FRACTURE Right 6/1/2021    Procedure: OPEN REDUCTION W/ INTERNAL FIXATION (ORIF) TIBIA;  Surgeon: Wisam Francis MD;  [facility];  Service: Orthopedics    MA OPEN TREATMENT CALCANEAL FRACTURE Right 7/1/2021    Procedure: OPEN REDUCTION W/ INTERNAL FIXATION (ORIF) RIGHT CALCANEUS FRACTURE;  Surgeon: Carlos Onofre MD;  [facility];  Service: Orthopedics       No family history on file      Social History     Socioeconomic History    Marital status: /Civil Union     Spouse name: None    Number of children: None    Years of education: None    Highest education level: None   Occupational History    None   Tobacco Use    Smoking status: Never Smoker    Smokeless tobacco: Never Used   Vaping Use    Vaping Use: Never used   Substance and Sexual Activity    Alcohol use: Never    Drug use: Never    Sexual activity: None   Other Topics Concern    None   Social History Narrative    None     Social Determinants of Health     Financial Resource Strain: Not on file   Food Insecurity: Not on file   Transportation Needs: Not on file   Physical Activity: Not on file   Stress: Not on file   Social Connections: Not on file   Intimate Partner Violence: Not on file   Housing Stability: Not on file         Current Outpatient Medications:     omeprazole (PriLOSEC) 20 mg delayed release capsule, Take 20 mg by mouth daily , Disp: , Rfl:     acetaminophen (TYLENOL) 325 mg tablet, Take 3 tablets (975 mg total) by mouth every 8 (eight) hours (Patient not taking: Reported on 7/22/2021), Disp: 30 tablet, Rfl: 0    No Known Allergies    Objective:  /78 (BP Location: Left arm, Patient Position: Sitting, Cuff Size: Standard)   Pulse 61   Temp 97 7 °F (36 5 °C) (Temporal)   Resp 16   Ht 5' 11" (1 803 m)   Wt 88 kg (194 lb)   SpO2 100%   BMI 27 06 kg/m²    Physical Exam  Constitutional:       Appearance: Normal appearance  He is well-developed  HENT:      Head: Normocephalic and atraumatic  Eyes:      Pupils: Pupils are equal, round, and reactive to light  Pulmonary:      Effort: Pulmonary effort is normal  No respiratory distress  Musculoskeletal:         General: Normal range of motion  Cervical back: Normal range of motion  Lymphadenopathy:      Cervical: No cervical adenopathy  Neurological:      Mental Status: He is alert and oriented to person, place, and time     Psychiatric:         Behavior: Behavior normal        Result Review  Labs:  Appointment on 01/28/2022   Component Date Value Ref Range Status    AntiThrombIN III Activity 01/28/2022 108  92 - 136 % of Normal Final    ANTICARDIOLIPIN IGG ANTIBODY 01/28/2022 1 1  See comment Final    ANTICARDIOLIPIN IGA ANTIBODY 01/28/2022 2 8  See comment Final    ANTICARDIOLIPIN IGM ANTIBODY 01/28/2022 6 6  See comment Final    Factor V Leiden 01/28/2022 see written report   Final    PTT Lupus Anticoagulant 01/28/2022 30 0  0 0 - 51 9 sec Final    Dilute Viper Venom Time 01/28/2022 32 7  0 0 - 47 0 sec Final    DILUTE PROTHROMBIN TIME(DPT) 01/28/2022 38 5  0 0 - 47 6 sec Final    THROMBIN TIME (DRVW) 01/28/2022 18 6  0 0 - 23 0 sec Final    DPT CONFIRM RATIO 01/28/2022 0 97  0 00 - 1 34 Ratio Final    LUPUS REFLEX INTERPRETATION 01/28/2022 Comment:   Final    No lupus anticoagulant was detected   Protein C Activity 01/28/2022 79 0  60 - 150 % of Normal Final    PROTEIN S ACTIVITY 01/28/2022 67* 71 - 117 % Final    Protein S Ag, Total 01/28/2022 95  60 - 150 % Final    This test was developed and its performance characteristics  determined by Jose Carlos Cota  It has not been cleared or approved  by the Food and Drug Administration   Protein S Ag, Free 01/28/2022 88  61 - 136 % Final    Prothrombin Mutation 01/28/2022 see written report   Final    Prothrombin Gene Mutation Interp 01/28/2022 see written report   Final    BETA 2 GLYCO 1 IGG 01/28/2022 0 8  See comment Final    BETA 2 GLYCO 1 IGA 01/28/2022 1 6  See comment Final    BETA 2 GLYCO 1 IGM 01/28/2022 <2 9  See comment Final       Please note: This report has been generated by a voice recognition software system  Therefore there may be syntax, spelling, and/or grammatical errors  Please call if you have any questions

## 2024-01-10 ENCOUNTER — OFFICE VISIT (OUTPATIENT)
Dept: INTERNAL MEDICINE CLINIC | Facility: CLINIC | Age: 74
End: 2024-01-10
Payer: MEDICARE

## 2024-01-10 VITALS
DIASTOLIC BLOOD PRESSURE: 82 MMHG | BODY MASS INDEX: 27.72 KG/M2 | HEART RATE: 70 BPM | HEIGHT: 71 IN | OXYGEN SATURATION: 97 % | WEIGHT: 198 LBS | SYSTOLIC BLOOD PRESSURE: 124 MMHG | RESPIRATION RATE: 17 BRPM

## 2024-01-10 DIAGNOSIS — M12.519 TRAUMATIC ARTHRITIS OF SHOULDER REGION: ICD-10-CM

## 2024-01-10 DIAGNOSIS — Z00.00 MEDICARE ANNUAL WELLNESS VISIT, SUBSEQUENT: Primary | ICD-10-CM

## 2024-01-10 DIAGNOSIS — Z13.220 SCREENING FOR HYPERLIPIDEMIA: ICD-10-CM

## 2024-01-10 DIAGNOSIS — Z12.5 SCREENING FOR PROSTATE CANCER: ICD-10-CM

## 2024-01-10 DIAGNOSIS — Z13.1 SCREENING FOR DIABETES MELLITUS: ICD-10-CM

## 2024-01-10 DIAGNOSIS — Z87.81 HISTORY OF FRACTURE OF TIBIA: ICD-10-CM

## 2024-01-10 DIAGNOSIS — Z13.0 SCREENING FOR DEFICIENCY ANEMIA: ICD-10-CM

## 2024-01-10 DIAGNOSIS — N40.1 BENIGN PROSTATIC HYPERPLASIA WITH NOCTURIA: ICD-10-CM

## 2024-01-10 DIAGNOSIS — S46.912A STRAIN OF LEFT SHOULDER, INITIAL ENCOUNTER: ICD-10-CM

## 2024-01-10 DIAGNOSIS — R35.1 BENIGN PROSTATIC HYPERPLASIA WITH NOCTURIA: ICD-10-CM

## 2024-01-10 DIAGNOSIS — K21.9 GASTROESOPHAGEAL REFLUX DISEASE WITHOUT ESOPHAGITIS: ICD-10-CM

## 2024-01-10 PROBLEM — I82.509 CHRONIC DEEP VEIN THROMBOSIS (DVT) (HCC): Status: RESOLVED | Noted: 2021-09-22 | Resolved: 2024-01-10

## 2024-01-10 PROBLEM — S92.011A DISPLACED FRACTURE OF BODY OF RIGHT CALCANEUS, INITIAL ENCOUNTER FOR CLOSED FRACTURE: Status: RESOLVED | Noted: 2021-06-01 | Resolved: 2024-01-10

## 2024-01-10 PROBLEM — Z48.89 AFTERCARE FOLLOWING SURGERY: Status: RESOLVED | Noted: 2021-07-22 | Resolved: 2024-01-10

## 2024-01-10 PROBLEM — S82.401A TIBIA/FIBULA FRACTURE, RIGHT, CLOSED, INITIAL ENCOUNTER: Status: RESOLVED | Noted: 2021-06-01 | Resolved: 2024-01-10

## 2024-01-10 PROBLEM — S82.201A TIBIA/FIBULA FRACTURE, RIGHT, CLOSED, INITIAL ENCOUNTER: Status: RESOLVED | Noted: 2021-06-01 | Resolved: 2024-01-10

## 2024-01-10 PROBLEM — G89.11 ACUTE PAIN DUE TO TRAUMA: Status: RESOLVED | Noted: 2021-06-01 | Resolved: 2024-01-10

## 2024-01-10 PROBLEM — Z98.890 S/P ORIF (OPEN REDUCTION INTERNAL FIXATION) FRACTURE: Status: RESOLVED | Noted: 2021-07-02 | Resolved: 2024-01-10

## 2024-01-10 PROBLEM — Z98.890 STATUS POST OPEN REDUCTION AND INTERNAL FIXATION (ORIF) OF FRACTURE: Status: RESOLVED | Noted: 2021-07-02 | Resolved: 2024-01-10

## 2024-01-10 PROBLEM — M25.511 ACUTE PAIN OF RIGHT SHOULDER: Status: RESOLVED | Noted: 2021-06-01 | Resolved: 2024-01-10
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
## 2024-01-10 PROCEDURE — 99214 OFFICE O/P EST MOD 30 MIN: CPT | Performed by: INTERNAL MEDICINE

## 2024-01-10 PROCEDURE — G0438 PPPS, INITIAL VISIT: HCPCS | Performed by: INTERNAL MEDICINE
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
## 2024-01-10 NOTE — PATIENT INSTRUCTIONS
Medicare Preventive Visit Patient Instructions  Thank you for completing your Welcome to Medicare Visit or Medicare Annual Wellness Visit today. Your next wellness visit will be due in one year (1/10/2025).  The screening/preventive services that you may require over the next 5-10 years are detailed below. Some tests may not apply to you based off risk factors and/or age. Screening tests ordered at today's visit but not completed yet may show as past due. Also, please note that scanned in results may not display below.  Preventive Screenings:  Service Recommendations Previous Testing/Comments   Colorectal Cancer Screening  Colonoscopy    Fecal Occult Blood Test (FOBT)/Fecal Immunochemical Test (FIT)  Fecal DNA/Cologuard Test  Flexible Sigmoidoscopy Age: 45-75 years old   Colonoscopy: every 10 years (May be performed more frequently if at higher risk)  OR  FOBT/FIT: every 1 year  OR  Cologuard: every 3 years  OR  Sigmoidoscopy: every 5 years  Screening may be recommended earlier than age 45 if at higher risk for colorectal cancer. Also, an individualized decision between you and your healthcare provider will decide whether screening between the ages of 76-85 would be appropriate. Colonoscopy: Not on file  FOBT/FIT: Not on file  Cologuard: Not on file  Sigmoidoscopy: Not on file          Prostate Cancer Screening Individualized decision between patient and health care provider in men between ages of 55-69   Medicare will cover every 12 months beginning on the day after your 50th birthday PSA: No results in last 5 years           Hepatitis C Screening Once for adults born between 1945 and 1965  More frequently in patients at high risk for Hepatitis C Hep C Antibody: Not on file        Diabetes Screening 1-2 times per year if you're at risk for diabetes or have pre-diabetes Fasting glucose: 147 mg/dL (7/2/2021)  A1C: No results in last 5 years (No results in last 5 years)      Cholesterol Screening Once every 5 years if  you don't have a lipid disorder. May order more often based on risk factors. Lipid panel: Not on file         Other Preventive Screenings Covered by Medicare:  Abdominal Aortic Aneurysm (AAA) Screening: covered once if your at risk. You're considered to be at risk if you have a family history of AAA or a male between the age of 65-75 who smoking at least 100 cigarettes in your lifetime.  Lung Cancer Screening: covers low dose CT scan once per year if you meet all of the following conditions: (1) Age 55-77; (2) No signs or symptoms of lung cancer; (3) Current smoker or have quit smoking within the last 15 years; (4) You have a tobacco smoking history of at least 20 pack years (packs per day x number of years you smoked); (5) You get a written order from a healthcare provider.  Glaucoma Screening: covered annually if you're considered high risk: (1) You have diabetes OR (2) Family history of glaucoma OR (3)  aged 50 and older OR (4)  American aged 65 and older  Osteoporosis Screening: covered every 2 years if you meet one of the following conditions: (1) Have a vertebral abnormality; (2) On glucocorticoid therapy for more than 3 months; (3) Have primary hyperparathyroidism; (4) On osteoporosis medications and need to assess response to drug therapy.  HIV Screening: covered annually if you're between the age of 15-65. Also covered annually if you are younger than 15 and older than 65 with risk factors for HIV infection. For pregnant patients, it is covered up to 3 times per pregnancy.    Immunizations:  Immunization Recommendations   Influenza Vaccine Annual influenza vaccination during flu season is recommended for all persons aged >= 6 months who do not have contraindications   Pneumococcal Vaccine   * Pneumococcal conjugate vaccine = PCV13 (Prevnar 13), PCV15 (Vaxneuvance), PCV20 (Prevnar 20)  * Pneumococcal polysaccharide vaccine = PPSV23 (Pneumovax) Adults 19-65 yo with certain risk factors  or if 65+ yo  If never received any pneumonia vaccine: recommend Prevnar 20 (PCV20)  Give PCV20 if previously received 1 dose of PCV13 or PPSV23   Hepatitis B Vaccine 3 dose series if at intermediate or high risk (ex: diabetes, end stage renal disease, liver disease)   Respiratory syncytial virus (RSV) Vaccine - COVERED BY MEDICARE PART D  * RSVPreF3 (Arexvy) CDC recommends that adults 60 years of age and older may receive a single dose of RSV vaccine using shared clinical decision-making (SCDM)   Tetanus (Td) Vaccine - COST NOT COVERED BY MEDICARE PART B Following completion of primary series, a booster dose should be given every 10 years to maintain immunity against tetanus. Td may also be given as tetanus wound prophylaxis.   Tdap Vaccine - COST NOT COVERED BY MEDICARE PART B Recommended at least once for all adults. For pregnant patients, recommended with each pregnancy.   Shingles Vaccine (Shingrix) - COST NOT COVERED BY MEDICARE PART B  2 shot series recommended in those 19 years and older who have or will have weakened immune systems or those 50 years and older     Health Maintenance Due:      Topic Date Due   • Hepatitis C Screening  Never done   • Colorectal Cancer Screening  Never done     Immunizations Due:      Topic Date Due   • COVID-19 Vaccine (1) Never done   • Pneumococcal Vaccine: 65+ Years (1 - PCV) Never done   • Influenza Vaccine (1) Never done     Advance Directives   What are advance directives?  Advance directives are legal documents that state your wishes and plans for medical care. These plans are made ahead of time in case you lose your ability to make decisions for yourself. Advance directives can apply to any medical decision, such as the treatments you want, and if you want to donate organs.   What are the types of advance directives?  There are many types of advance directives, and each state has rules about how to use them. You may choose a combination of any of the following:  Living  will:  This is a written record of the treatment you want. You can also choose which treatments you do not want, which to limit, and which to stop at a certain time. This includes surgery, medicine, IV fluid, and tube feedings.   Durable power of  for healthcare (DPAHC):  This is a written record that states who you want to make healthcare choices for you when you are unable to make them for yourself. This person, called a proxy, is usually a family member or a friend. You may choose more than 1 proxy.  Do not resuscitate (DNR) order:  A DNR order is used in case your heart stops beating or you stop breathing. It is a request not to have certain forms of treatment, such as CPR. A DNR order may be included in other types of advance directives.  Medical directive:  This covers the care that you want if you are in a coma, near death, or unable to make decisions for yourself. You can list the treatments you want for each condition. Treatment may include pain medicine, surgery, blood transfusions, dialysis, IV or tube feedings, and a ventilator (breathing machine).  Values history:  This document has questions about your views, beliefs, and how you feel and think about life. This information can help others choose the care that you would choose.  Why are advance directives important?  An advance directive helps you control your care. Although spoken wishes may be used, it is better to have your wishes written down. Spoken wishes can be misunderstood, or not followed. Treatments may be given even if you do not want them. An advance directive may make it easier for your family to make difficult choices about your care.   Weight Management   Why it is important to manage your weight:  Being overweight increases your risk of health conditions such as heart disease, high blood pressure, type 2 diabetes, and certain types of cancer. It can also increase your risk for osteoarthritis, sleep apnea, and other respiratory  problems. Aim for a slow, steady weight loss. Even a small amount of weight loss can lower your risk of health problems.  How to lose weight safely:  A safe and healthy way to lose weight is to eat fewer calories and get regular exercise. You can lose up about 1 pound a week by decreasing the number of calories you eat by 500 calories each day.   Healthy meal plan for weight management:  A healthy meal plan includes a variety of foods, contains fewer calories, and helps you stay healthy. A healthy meal plan includes the following:  Eat whole-grain foods more often.  A healthy meal plan should contain fiber. Fiber is the part of grains, fruits, and vegetables that is not broken down by your body. Whole-grain foods are healthy and provide extra fiber in your diet. Some examples of whole-grain foods are whole-wheat breads and pastas, oatmeal, brown rice, and bulgur.  Eat a variety of vegetables every day.  Include dark, leafy greens such as spinach, kale, carlito greens, and mustard greens. Eat yellow and orange vegetables such as carrots, sweet potatoes, and winter squash.   Eat a variety of fruits every day.  Choose fresh or canned fruit (canned in its own juice or light syrup) instead of juice. Fruit juice has very little or no fiber.  Eat low-fat dairy foods.  Drink fat-free (skim) milk or 1% milk. Eat fat-free yogurt and low-fat cottage cheese. Try low-fat cheeses such as mozzarella and other reduced-fat cheeses.  Choose meat and other protein foods that are low in fat.  Choose beans or other legumes such as split peas or lentils. Choose fish, skinless poultry (chicken or turkey), or lean cuts of red meat (beef or pork). Before you cook meat or poultry, cut off any visible fat.   Use less fat and oil.  Try baking foods instead of frying them. Add less fat, such as margarine, sour cream, regular salad dressing and mayonnaise to foods. Eat fewer high-fat foods. Some examples of high-fat foods include french fries,  doughnuts, ice cream, and cakes.  Eat fewer sweets.  Limit foods and drinks that are high in sugar. This includes candy, cookies, regular soda, and sweetened drinks.  Exercise:  Exercise at least 30 minutes per day on most days of the week. Some examples of exercise include walking, biking, dancing, and swimming. You can also fit in more physical activity by taking the stairs instead of the elevator or parking farther away from stores. Ask your healthcare provider about the best exercise plan for you.      © Copyright [employer] 2018 Information is for End User's use only and may not be sold, redistributed or otherwise used for commercial purposes. All illustrations and images included in CareNotes® are the copyrighted property of [employer] or [employer]
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
## 2024-01-11 ENCOUNTER — APPOINTMENT (OUTPATIENT)
Dept: RADIOLOGY | Age: 74
End: 2024-01-11
Payer: MEDICARE

## 2024-01-11 ENCOUNTER — OFFICE VISIT (OUTPATIENT)
Dept: OBGYN CLINIC | Facility: CLINIC | Age: 74
End: 2024-01-11
Payer: MEDICARE

## 2024-01-11 VITALS
SYSTOLIC BLOOD PRESSURE: 125 MMHG | HEART RATE: 83 BPM | HEIGHT: 71 IN | BODY MASS INDEX: 29.4 KG/M2 | WEIGHT: 210 LBS | DIASTOLIC BLOOD PRESSURE: 77 MMHG

## 2024-01-11 DIAGNOSIS — M25.512 ACUTE PAIN OF LEFT SHOULDER: Primary | ICD-10-CM

## 2024-01-11 DIAGNOSIS — M25.512 ACUTE PAIN OF LEFT SHOULDER: ICD-10-CM

## 2024-01-11 DIAGNOSIS — M25.812 IMPINGEMENT OF LEFT SHOULDER: ICD-10-CM

## 2024-01-11 DIAGNOSIS — M75.82 TENDINITIS OF LEFT ROTATOR CUFF: ICD-10-CM

## 2024-01-11 PROCEDURE — 20610 DRAIN/INJ JOINT/BURSA W/O US: CPT | Performed by: PHYSICIAN ASSISTANT

## 2024-01-11 PROCEDURE — 73030 X-RAY EXAM OF SHOULDER: CPT

## 2024-01-11 PROCEDURE — 99213 OFFICE O/P EST LOW 20 MIN: CPT | Performed by: PHYSICIAN ASSISTANT

## 2024-01-11 RX ORDER — TRIAMCINOLONE ACETONIDE 40 MG/ML
80 INJECTION, SUSPENSION INTRA-ARTICULAR; INTRAMUSCULAR
Status: COMPLETED | OUTPATIENT
Start: 2024-01-11 | End: 2024-01-11

## 2024-01-11 RX ORDER — BUPIVACAINE HYDROCHLORIDE 2.5 MG/ML
2 INJECTION, SOLUTION INFILTRATION; PERINEURAL
Status: COMPLETED | OUTPATIENT
Start: 2024-01-11 | End: 2024-01-11

## 2024-01-11 RX ADMIN — TRIAMCINOLONE ACETONIDE 80 MG: 40 INJECTION, SUSPENSION INTRA-ARTICULAR; INTRAMUSCULAR at 17:30

## 2024-01-11 RX ADMIN — BUPIVACAINE HYDROCHLORIDE 2 ML: 2.5 INJECTION, SOLUTION INFILTRATION; PERINEURAL at 17:30

## 2024-01-11 NOTE — PATIENT INSTRUCTIONS
Rotator Cuff Tendinitis   AMBULATORY CARE:   Rotator cuff tendinitis  is inflammation of the tendons in your shoulder joint. A tendon is a cord of tough tissue that connects your muscles to your bones. The rotator cuff is made up of a group of muscles and tendons that hold the shoulder joint in place.        Common signs and symptoms:   Pain and swelling in your shoulder, especially when you lift your arm over your head    Pain that is worse after you sleep on the affected shoulder    Pain can become worse and you may have pain even when you are resting    Shoulder and arm weakness    Call your doctor or orthopedist if:   You have sudden shortness of breath or chest pain.    Any part of your arm is numb, tingly, cold, blue, or pale.    You have pain and swelling in your shoulder even after you take pain medicine.    Your skin is itchy, swollen, or has a rash.    Your symptoms are not getting better or are getting worse.    You have questions or concerns about your condition or care.    Treatment  may include any of the following:  Medicines  such as steroids or NSAIDs may be used to reduce swelling. This can help relieve pain. Steroids may be injected into the rotator cuff area. NSAIDs are available without a doctor's order. Ask your healthcare provider which medicine is right for you. Ask how much to take and when to take it. Take as directed. NSAIDs can cause stomach bleeding or kidney problems if not taken correctly.    Surgery  may be needed if the pain and tightening in your shoulder do not go away. This may also be done if pain worsens or is so severe that it affects your daily activities. During surgery, your healthcare provider may remove bone spurs and inflamed tissue around the shoulder.    Physical therapy  can help you improve movement and strength, and decrease pain. A physical therapist will teach you safe exercises. The exercises may help you move your shoulder normally again and strengthen your  rotator cuff. You may learn changes to make to your daily activities that will help decrease stress on your tendons.    Care for your rotator cuff tendinitis at home:   Rest as directed.  Limit activity on your affected shoulder to decrease stress on the tendon. This may help prevent further damage, decrease pain, and promote healing.     Apply ice on your shoulder area.  Ice helps decrease swelling and pain. Ice may also help prevent tissue damage. Use an ice pack, or put crushed ice in a plastic bag. Cover it with a towel and place it on your shoulder for 15 to 20 minutes every hour or as directed.    Keep your shoulder in the correct position so it will heal faster.  This may be done by increasing the height of armrests while you work, drive, and sit. Try not to sleep on the side of your injured shoulder. If you are a woman, wear a sports bra so that the straps are closer to your neck. This may help decrease stress in the affected shoulder.       Follow up with your doctor or orthopedist as directed:  Write down your questions so you remember to ask them during your visits.  © Copyright [employer] 2023 Information is for End User's use only and may not be sold, redistributed or otherwise used for commercial purposes.  The above information is an  only. It is not intended as medical advice for individual conditions or treatments. Talk to your doctor, nurse or pharmacist before following any medical regimen to see if it is safe and effective for you.
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
## 2024-01-15 ENCOUNTER — EVALUATION (OUTPATIENT)
Dept: PHYSICAL THERAPY | Facility: CLINIC | Age: 74
End: 2024-01-15
Payer: MEDICARE

## 2024-01-15 DIAGNOSIS — M75.82 TENDINITIS OF LEFT ROTATOR CUFF: ICD-10-CM

## 2024-01-15 DIAGNOSIS — M25.512 ACUTE PAIN OF LEFT SHOULDER: Primary | ICD-10-CM

## 2024-01-15 DIAGNOSIS — M25.812 IMPINGEMENT OF LEFT SHOULDER: ICD-10-CM

## 2024-01-15 PROCEDURE — 97161 PT EVAL LOW COMPLEX 20 MIN: CPT | Performed by: PHYSICAL THERAPIST
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
## 2024-01-16 ENCOUNTER — TELEPHONE (OUTPATIENT)
Age: 74
End: 2024-01-16

## 2024-01-16 NOTE — TELEPHONE ENCOUNTER
Caller: Patient    Doctor/Office: PT    Call regarding :  Canceling PT appt     Call was transferred to: PT

## 2024-01-17 ENCOUNTER — APPOINTMENT (OUTPATIENT)
Dept: PHYSICAL THERAPY | Facility: CLINIC | Age: 74
End: 2024-01-17
Payer: MEDICARE

## 2024-01-23 ENCOUNTER — OFFICE VISIT (OUTPATIENT)
Dept: PHYSICAL THERAPY | Facility: CLINIC | Age: 74
End: 2024-01-23
Payer: MEDICARE

## 2024-01-23 DIAGNOSIS — M25.512 ACUTE PAIN OF LEFT SHOULDER: Primary | ICD-10-CM

## 2024-01-23 PROCEDURE — 97110 THERAPEUTIC EXERCISES: CPT | Performed by: PHYSICAL THERAPIST

## 2024-01-23 PROCEDURE — 97140 MANUAL THERAPY 1/> REGIONS: CPT | Performed by: PHYSICAL THERAPIST

## 2024-01-23 PROCEDURE — 97530 THERAPEUTIC ACTIVITIES: CPT | Performed by: PHYSICAL THERAPIST

## 2024-01-23 NOTE — PROGRESS NOTES
Daily Note     Today's date: 2024  Patient name: Juan Pablo Timmons  : 1950  MRN: [id number]  Referring provider: Anil Knapp PA*  Dx:   Encounter Diagnosis     ICD-10-CM    1. Acute pain of left shoulder  M25.512                      Subjective: pt reports partial compliance with his hep and that he is having no difficulty with it. Reports increased soreness after using his       Objective: See treatment diary below    Access Code: [email or web address]  URL: [email or web address]  Date: 2024  Prepared by: Norm Botello    Program Notes  KEEP ALL EXERCISES IN A COMFORTABLE RANGE OF MOTIONDO NOT FORCE INTO PAIN    Exercises  - Standing Scapular Retraction  - 3 x daily - 7 x weekly - 3 sets - 10 reps - 5 seconds hold  - Seated Shoulder Flexion Towel Slide at Table Top  - 3 x daily - 7 x weekly - 10 reps - 5 seconds hold  - Seated Shoulder Abduction Towel Slide at Table Top with Forearm in Neutral  - 3 x daily - 7 x weekly - 10 reps - 5 seconds hold  - Sidelying Shoulder External Rotation AROM  - 1 x daily - 7 x weekly - 20 reps  - Supine Shoulder External Rotation in 45 Degrees Abduction AAROM with Dowel  - 1 x daily - 7 x weekly - 20 reps - 5 seconds hold  - Sleeper Stretch  - 1 x daily - 7 x weekly - 4 reps - 15 seconds hold  - Standing Shoulder Extension with Dowel  - 1 x daily - 7 x weekly - 10 reps - 10 seconds hold      Assessment: initiated therex as listed, requires cues initially for form but this improves with reps.   Good tolerance to manual tx with sig improvements in PROM noted post-tx  Pt reports soreness t/o tx but that it is tolerable  Provided pt with updated hep including sleeper stretch and cane ext  Monitor response and progress as maycol NV      Plan: Continue per plan of care.      Precautions: R RTC repair, missing digit 4-5      Manuals             prom kl            Jnt mobs kl            laser             Epat  D15 3000 pulses  3.5 barr            Neuro  "Re-Ed                                                                                                        Ther Ex             pulleys             Sidelying ER             Tb ir Green x20            Tb er Green tb x20            rows             lpd Black 5\"x20            Supine cane ER             Sleeper stretch if comfortable 15\"x4            Scap retraction with cane extension 10\"x10            Ther Activity             ube 4'/4'                         Gait Training                                       Modalities                                            "

## 2024-01-25 ENCOUNTER — OFFICE VISIT (OUTPATIENT)
Dept: PHYSICAL THERAPY | Facility: CLINIC | Age: 74
End: 2024-01-25
Payer: MEDICARE

## 2024-01-25 DIAGNOSIS — M75.82 TENDINITIS OF LEFT ROTATOR CUFF: ICD-10-CM

## 2024-01-25 DIAGNOSIS — M25.812 IMPINGEMENT OF LEFT SHOULDER: ICD-10-CM

## 2024-01-25 DIAGNOSIS — M25.512 ACUTE PAIN OF LEFT SHOULDER: Primary | ICD-10-CM

## 2024-01-25 PROCEDURE — 97110 THERAPEUTIC EXERCISES: CPT

## 2024-01-25 PROCEDURE — 97140 MANUAL THERAPY 1/> REGIONS: CPT

## 2024-01-25 NOTE — PROGRESS NOTES
"Daily Note     Today's date: 2024  Patient name: Juan Pablo Timmons  : 1950  MRN: [id number]  Referring provider: Anil Knapp PA*  Dx:   Encounter Diagnosis     ICD-10-CM    1. Acute pain of left shoulder  M25.512       2. Impingement of left shoulder  M25.812       3. Tendinitis of left rotator cuff  M75.82           Start Time: 0802  Stop Time: 0846  Total time in clinic (min): 44 minutes      Subjective: Patient reports he had no problem after last PT session.  Patient reports compliance with the HEP.      Objective: See treatment diary below.      Assessment: Progression of TE program is tolerated well.  Left shoulder IR ROM visibly improved with the manual therapy.  Encouraged patient to perform rows and pull downs at home and provided him with theraband for home use.      Plan: Continue treatment as per PT plan of care.       Precautions: R RTC repair, missing digits 4-5      Manuals            prom kl JLW           Jnt mobs kl            laser             Epat  D15 3000 pulses  3.5 barr                                      Neuro Re-Ed                                                                                                        Ther Ex             pulleys  5\"x20           Sidelying ER  3#  20           Tb ir Green x20            Tb er Green tb x20 green  20           rows  yovanny  16.8  5\"x20           lpd Black 5\"x20 black 5\"x20           Supine cane ER             Sleeper stretch if comfortable 15\"x4 30\"x4           Scap retraction with cane extension 10\"x10 3#  10\"x10                                     Ther Activity             ube 4'/4' lvl 3  4'/4'                        Gait Training                                       Modalities                                              "

## 2024-01-30 ENCOUNTER — OFFICE VISIT (OUTPATIENT)
Dept: PHYSICAL THERAPY | Facility: CLINIC | Age: 74
End: 2024-01-30
Payer: MEDICARE

## 2024-01-30 DIAGNOSIS — M25.812 IMPINGEMENT OF LEFT SHOULDER: ICD-10-CM

## 2024-01-30 DIAGNOSIS — M25.512 ACUTE PAIN OF LEFT SHOULDER: Primary | ICD-10-CM

## 2024-01-30 DIAGNOSIS — M75.82 TENDINITIS OF LEFT ROTATOR CUFF: ICD-10-CM

## 2024-01-30 PROCEDURE — 97110 THERAPEUTIC EXERCISES: CPT

## 2024-01-30 PROCEDURE — 97140 MANUAL THERAPY 1/> REGIONS: CPT

## 2024-01-30 NOTE — PROGRESS NOTES
"Daily Note     Today's date: 2024  Patient name: Juan Pablo Timmons  : 1950  MRN: [id number]  Referring provider: Anil Knapp PA*  Dx:   Encounter Diagnosis     ICD-10-CM    1. Acute pain of left shoulder  M25.512       2. Impingement of left shoulder  M25.812       3. Tendinitis of left rotator cuff  M75.82           Start Time: 0803  Stop Time: 0845  Total time in clinic (min): 42 minutes      Subjective: Patient reports he is still experiencing left shoulder pain at times - for example, when pushing himself up off the couch last night.      Objective: See treatment diary below.      Assessment: Fatigue noted when performing shoulder ER exercises.  Left shoulder PROM tolerated well.      Plan: Continue treatment as per PT plan of care.       Precautions: R RTC repair, missing digits 4-5      Manuals           prom kl JLW JLW          Jnt mobs kl            laser             Epat  D15 3000 pulses  3.5 barr   pulses  3.5 barr  15 Hz  JLW                                    Neuro Re-Ed                                                                                                        Ther Ex             pulleys  5\"x20           Sidelying ER  3#  20 3#  20          Tb ir Green x20            Tb er Green tb x20 green  20 green  20          rows  yovanny  16.8  5\"x20 yovanny  17.8  5\"x20          lpd Black 5\"x20 black 5\"x20 yovanny  16.4  5\"x20          Supine cane ER             Sleeper stretch if comfortable 15\"x4 30\"x4           Scap retraction with cane extension 10\"x10 3#  10\"x10 3#  10\"x10                                    Ther Activity             ube 4'/4' lvl 3  4'/4' lvl 3  4'/4'                       Gait Training                                       Modalities                                              "

## 2024-02-02 ENCOUNTER — APPOINTMENT (OUTPATIENT)
Dept: PHYSICAL THERAPY | Facility: CLINIC | Age: 74
End: 2024-02-02
Payer: MEDICARE

## 2024-02-06 ENCOUNTER — OFFICE VISIT (OUTPATIENT)
Dept: PHYSICAL THERAPY | Facility: CLINIC | Age: 74
End: 2024-02-06
Payer: MEDICARE

## 2024-02-06 DIAGNOSIS — M25.812 IMPINGEMENT OF LEFT SHOULDER: ICD-10-CM

## 2024-02-06 DIAGNOSIS — M75.82 TENDINITIS OF LEFT ROTATOR CUFF: ICD-10-CM

## 2024-02-06 DIAGNOSIS — M25.512 ACUTE PAIN OF LEFT SHOULDER: Primary | ICD-10-CM

## 2024-02-06 PROCEDURE — 97110 THERAPEUTIC EXERCISES: CPT

## 2024-02-06 PROCEDURE — 97140 MANUAL THERAPY 1/> REGIONS: CPT

## 2024-02-06 NOTE — PROGRESS NOTES
"Daily Note     Today's date: 2024  Patient name: Juan Pablo Timmons  : 1950  MRN: [id number]  Referring provider: Anil Knapp PA*  Dx:   Encounter Diagnosis     ICD-10-CM    1. Acute pain of left shoulder  M25.512       2. Impingement of left shoulder  M25.812       3. Tendinitis of left rotator cuff  M75.82           Start Time: 08  Stop Time: 08  Total time in clinic (min): 42 minutes      Subjective: Patient reports his left shoulder was feeling \"shitty\" after working on a heavy cabinet and a door however today it's better.  Patient reports compliance with the HEP.      Objective: See treatment diary below.      Assessment: Fatigue noted when performing shoulder ER exercises.  Left shoulder PROM tolerated well.  Left shoulder IR ROM improved.      Plan: Continue treatment as per PT plan of care.       Precautions: R RTC repair, missing digits 4-5      Manuals          prom kl JLW JLW JLW         Jnt mobs kl            laser             Epat  D15 3000 pulses  3.5 barr   pulses  3.5 perkins  15 Hz  JLW  pulses  3.9 barr  15 Hz  JLW                                   Neuro Re-Ed                                                                                                        Ther Ex             pulleys  5\"x20           Sidelying ER  3#  20 3#  20 3#  20         Tb ir Green x20            Tb er Green tb x20 green  20 green  20 green  20         rows  yovanny  16.8  5\"x20 yovanny  17.8  5\"x20 yovanny  16.4  30         lpd Black 5\"x20 black 5\"x20 yovanny  16.4  5\"x20 yovanny  16.4  30         Supine cane ER             Sleeper stretch if comfortable 15\"x4 30\"x4           Scap retraction with cane extension 10\"x10 3#  10\"x10 3#  10\"x10 3#  10\"x10         ube     lvl 3   3'/3'                                                Ther Activity             ube 4'/4' lvl 3  4'/4' lvl 3  4'/4'                       Gait Training                                       Modalities     "

## 2024-02-08 DIAGNOSIS — M79.605 LEFT LEG PAIN: Primary | ICD-10-CM

## 2024-02-13 ENCOUNTER — APPOINTMENT (OUTPATIENT)
Dept: PHYSICAL THERAPY | Facility: CLINIC | Age: 74
End: 2024-02-13
Payer: MEDICARE

## 2024-02-14 ENCOUNTER — HOSPITAL ENCOUNTER (OUTPATIENT)
Dept: RADIOLOGY | Facility: HOSPITAL | Age: 74
Discharge: HOME | End: 2024-02-14
Attending: ORTHOPAEDIC SURGERY
Payer: MEDICARE

## 2024-02-14 ENCOUNTER — OFFICE VISIT (OUTPATIENT)
Dept: OBGYN CLINIC | Facility: HOSPITAL | Age: 74
End: 2024-02-14
Payer: MEDICARE

## 2024-02-14 VITALS
BODY MASS INDEX: 27.35 KG/M2 | WEIGHT: 195.4 LBS | HEART RATE: 68 BPM | HEIGHT: 71 IN | DIASTOLIC BLOOD PRESSURE: 71 MMHG | SYSTOLIC BLOOD PRESSURE: 122 MMHG

## 2024-02-14 DIAGNOSIS — Z87.81 HISTORY OF FRACTURE OF TIBIA: ICD-10-CM

## 2024-02-14 DIAGNOSIS — T84.84XA PAINFUL ORTHOPAEDIC HARDWARE (HCC): Primary | ICD-10-CM

## 2024-02-14 DIAGNOSIS — M79.605 LEFT LEG PAIN: ICD-10-CM

## 2024-02-14 PROCEDURE — 73650 X-RAY EXAM OF HEEL: CPT

## 2024-02-14 PROCEDURE — 99214 OFFICE O/P EST MOD 30 MIN: CPT | Performed by: ORTHOPAEDIC SURGERY

## 2024-02-14 PROCEDURE — 73590 X-RAY EXAM OF LOWER LEG: CPT

## 2024-02-14 RX ORDER — CHLORHEXIDINE GLUCONATE ORAL RINSE 1.2 MG/ML
15 SOLUTION DENTAL ONCE
OUTPATIENT
Start: 2024-02-14 | End: 2024-02-14

## 2024-02-14 RX ORDER — CEFAZOLIN SODIUM 2 G/50ML
2000 SOLUTION INTRAVENOUS ONCE
OUTPATIENT
Start: 2024-02-14 | End: 2024-02-14
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
## 2024-02-20 ENCOUNTER — OFFICE VISIT (OUTPATIENT)
Dept: PHYSICAL THERAPY | Facility: CLINIC | Age: 74
End: 2024-02-20
Payer: MEDICARE

## 2024-02-20 DIAGNOSIS — M25.812 IMPINGEMENT OF LEFT SHOULDER: ICD-10-CM

## 2024-02-20 DIAGNOSIS — M25.512 ACUTE PAIN OF LEFT SHOULDER: Primary | ICD-10-CM

## 2024-02-20 PROCEDURE — 97530 THERAPEUTIC ACTIVITIES: CPT | Performed by: PHYSICAL THERAPIST

## 2024-02-20 PROCEDURE — 97140 MANUAL THERAPY 1/> REGIONS: CPT | Performed by: PHYSICAL THERAPIST

## 2024-02-20 PROCEDURE — 97110 THERAPEUTIC EXERCISES: CPT | Performed by: PHYSICAL THERAPIST

## 2024-02-20 NOTE — PROGRESS NOTES
"Daily Note     Today's date: 2024  Patient name: Juan Pablo Timmons  : 1950  MRN: [id number]  Referring provider: Anil Knapp PA*  Dx:   Encounter Diagnosis     ICD-10-CM    1. Acute pain of left shoulder  M25.512                        Subjective: Patient reports no increased in pain after his LV  and overall feels he is improving but that he \"overdid it\" while installing hardwood floors and is having increased pain as a result. Reports that he is scheduled to have hardware removed in his LE on 3/20/24      Objective: See treatment diary below.      Assessment: rom is improving nicely and pt is having less pain with resisted ER. Instructed pt to add scap punches, D1 ext and doorway pect stretch to his hep.      Plan: Continue treatment as per PT plan of care.       Precautions: R RTC repair, missing digits 4-5      Manuals         prom kl JLW JLW JLW kl        Jnt mobs kl            laser             Epat  D15 3000 pulses  3.5 barr   pulses  3.5 perkins  15 Hz  JLW  pulses  3.9 perkins  15 Hz  JLW  pulses  3.9 barr  15 Hz  JLW        graston     kl                     Neuro Re-Ed                                                                                                        Ther Ex             pulleys  5\"x20           Sidelying ER  3#  20 3#  20 3#  20 3# 3x10        Tb ir Green x20            Tb er Green tb x20 green  20 green  20 green  20         rows  yovanny  16.8  5\"x20 yovanny  17.8  5\"x20 yovanny  16.4  30 17.0        lpd Black 5\"x20 black 5\"x20 yovanny  16.4  5\"x20 yovanny  16.4  30 17.0        Supine cane ER             Sleeper stretch if comfortable 15\"x4 30\"x4           Scap retraction with cane extension 10\"x10 3#  10\"x10 3#  10\"x10 3#  10\"x10         ube     lvl 3   3'/3'         Sleeper stretch     15\"x4        Scap punches      Sidelying 5\"x20        D1 ext     8.0 kesier x20        Ther Activity             ube 4'/4' lvl 3  4'/4' lvl " 3  4'/4'  4/'4' lvl 3                     Gait Training                                       Modalities

## 2024-02-27 ENCOUNTER — APPOINTMENT (OUTPATIENT)
Dept: PHYSICAL THERAPY | Facility: CLINIC | Age: 74
End: 2024-02-27
Payer: MEDICARE

## 2024-03-05 ENCOUNTER — OFFICE VISIT (OUTPATIENT)
Dept: PHYSICAL THERAPY | Facility: CLINIC | Age: 74
End: 2024-03-05
Payer: MEDICARE

## 2024-03-05 DIAGNOSIS — M75.82 TENDINITIS OF LEFT ROTATOR CUFF: ICD-10-CM

## 2024-03-05 DIAGNOSIS — M25.812 IMPINGEMENT OF LEFT SHOULDER: ICD-10-CM

## 2024-03-05 DIAGNOSIS — M25.512 ACUTE PAIN OF LEFT SHOULDER: Primary | ICD-10-CM

## 2024-03-05 PROCEDURE — 97110 THERAPEUTIC EXERCISES: CPT

## 2024-03-05 PROCEDURE — 97530 THERAPEUTIC ACTIVITIES: CPT

## 2024-03-05 PROCEDURE — 97140 MANUAL THERAPY 1/> REGIONS: CPT

## 2024-03-05 NOTE — PROGRESS NOTES
"Daily Note/DC Summary     Today's date: 3/5/2024  Patient name: Juan Pablo Timmons  : 1950  MRN: [id number]  Referring provider: Anil Knapp PA*  Dx:   Encounter Diagnosis     ICD-10-CM    1. Acute pain of left shoulder  M25.512       2. Impingement of left shoulder  M25.812       3. Tendinitis of left rotator cuff  M75.82           Start Time: 846  Stop Time: 926  Total time in clinic (min): 40 minutes      Subjective: Patient reports his left shoulder doesn't hurt as much when putting on his shirt and jacket.  Patient reports he has been sick with bronchitis so he hasn't done much exercise.      Objective: See treatment diary below.      Assessment: Left shoulder ROM is within functional limits.  Patient is ready to transition to independent HEP.      Plan: Discharge from outpatient PT.       Precautions: R RTC repair, missing digits 4-5      Manuals 1/23 1/25 1/30 2/6 2/20 3/5       prom kl JLW JLW JLW kl JLW       Jnt mobs kl            laser             Epat  D15 3000 pulses  3.5 barr   pulses  3.5 perkins  15 Hz  JLW  pulses  3.9 perkins  15 Hz  JLW  pulses  3.9 perkins  15 Hz  JLW  pulses  4.1  barr  15 Hz  JLW       graston     kl JLW                    Neuro Re-Ed                                                                                                        Ther Ex             pulleys  5\"x20           Sidelying ER  3#  20 3#  20 3#  20 3# 3x10 5#  3x10       Tb ir Green x20            Tb er Green tb x20 green  20 green  20 green  20         rows  yovanny  16.8  5\"x20 yovanny  17.8  5\"x20 yovanny  16.4  30 17.0 yovanny  18.6  30       lpd Black 5\"x20 black 5\"x20 yovanny  16.4  5\"x20 yovanny  16.4  30 17.0 yovanny  17.4  30       Supine cane ER             Sleeper stretch if comfortable 15\"x4 30\"x4           Scap retraction with cane extension 10\"x10 3#  10\"x10 3#  10\"x10 3#  10\"x10         ube     lvl 3   3'/3'         Sleeper stretch     15\"x4        Scap punches     " " Sidelying 5\"x20  sidelying  1#  5\"x20       D1 ext     8.0 kesier x20 yovanny  8.6  30                                 Ther Activity             ube 4'/4' lvl 3  4'/4' lvl 3  4'/4'  4/'4' lvl 3 4'/4'                    Gait Training                                       Modalities                                              "

## 2024-03-11 ENCOUNTER — TELEPHONE (OUTPATIENT)
Age: 74
End: 2024-03-11

## 2024-03-11 NOTE — PRE-PROCEDURE INSTRUCTIONS
Pre-Surgery Instructions:   Medication Instructions    omeprazole (PriLOSEC) 20 mg delayed release capsule Take this medication day of surgery if normally taken in the morning.      Medication instructions for day surgery reviewed. Please use only a sip of water to take your instructed medications. Avoid all over the counter vitamins, supplements and NSAIDS for one week prior to surgery per anesthesia guidelines. Tylenol is ok to take as needed.     You will receive a call one business day prior to surgery with an arrival time and hospital directions. If your surgery is scheduled on a Monday, the hospital will be calling you on the Friday prior to your surgery. If you have not heard from anyone by 8pm, please call the hospital supervisor through the hospital  at [phone]. ([place] [phone] or [place] [phone]).    Do not eat or drink anything after midnight the night before your surgery, including candy, mints, lifesavers, or chewing gum. Do not drink alcohol 24hrs before your surgery. Try not to smoke at least 24hrs before your surgery.       Follow the pre surgery showering instructions as listed in the “My Surgical Experience Booklet” or otherwise provided by your surgeon's office. Do not use a blade to shave the surgical area 1 week before surgery. It is okay to use a clean electric clippers up to 24 hours before surgery. Do not apply any lotions, creams, including makeup, cologne, deodorant, or perfumes after showering on the day of your surgery. Do not use dry shampoo, hair spray, hair gel, or any type of hair products.     No contact lenses, eye make-up, or artificial eyelashes. Remove nail polish, including gel polish, and any artificial, gel, or acrylic nails if possible. Remove all jewelry including rings and body piercing jewelry.     Wear causal clothing that is easy to take on and off. Consider your type of surgery.    Keep any valuables, jewelry, piercings at home. Please bring  any specially ordered equipment (sling, braces) if indicated.    Arrange for a responsible person to drive you to and from the hospital on the day of your surgery. Please confirm the visitor policy for the day of your procedure when you receive your phone call with an arrival time.     Call the surgeon's office with any new illnesses, exposures, or additional questions prior to surgery.    Please reference your “My Surgical Experience Booklet” for additional information to prepare for your upcoming surgery.

## 2024-03-11 NOTE — TELEPHONE ENCOUNTER
Caller: Patient    Doctor: Dr. Berman    Reason for call: Patient calling stating that he is scheduled for surgery with Dr. Berman on 3/20/24 and he is also scheduled to be in court on 3/20/24.  Patient asking if a note can be written excusing him from court that day.  Patient asking if note can be printed and he will be in [place] office tomorrow to pick it up.  Please call patient to confirm note is ready to be picked up.     Call back#: [phone]

## 2024-03-12 ENCOUNTER — APPOINTMENT (OUTPATIENT)
Dept: LAB | Facility: CLINIC | Age: 74
End: 2024-03-12
Payer: MEDICARE

## 2024-03-12 ENCOUNTER — LAB (OUTPATIENT)
Dept: LAB | Facility: CLINIC | Age: 74
End: 2024-03-12
Payer: MEDICARE

## 2024-03-12 ENCOUNTER — HOSPITAL ENCOUNTER (OUTPATIENT)
Dept: RADIOLOGY | Facility: HOSPITAL | Age: 74
Discharge: HOME | End: 2024-03-12
Payer: MEDICARE

## 2024-03-12 DIAGNOSIS — T84.84XA PAINFUL ORTHOPAEDIC HARDWARE (HCC): ICD-10-CM

## 2024-03-12 DIAGNOSIS — Z13.220 SCREENING FOR HYPERLIPIDEMIA: ICD-10-CM

## 2024-03-12 LAB
ALBUMIN SERPL BCP-MCNC: 4.1 G/DL (ref 3.5–5)
ALP SERPL-CCNC: 82 U/L (ref 34–104)
ALT SERPL W P-5'-P-CCNC: 29 U/L (ref 7–52)
ANION GAP SERPL CALCULATED.3IONS-SCNC: 7 MMOL/L (ref 4–13)
AST SERPL W P-5'-P-CCNC: 19 U/L (ref 13–39)
ATRIAL RATE: 62 BPM
BACTERIA UR QL AUTO: ABNORMAL /HPF
BASOPHILS # BLD AUTO: 0.04 THOUSANDS/ÂΜL (ref 0–0.1)
BASOPHILS NFR BLD AUTO: 1 % (ref 0–1)
BILIRUB SERPL-MCNC: 0.6 MG/DL (ref 0.2–1)
BILIRUB UR QL STRIP: NEGATIVE
BUN SERPL-MCNC: 21 MG/DL (ref 5–25)
CALCIUM SERPL-MCNC: 9.1 MG/DL (ref 8.4–10.2)
CHLORIDE SERPL-SCNC: 107 MMOL/L (ref 96–108)
CHOLEST SERPL-MCNC: 159 MG/DL
CLARITY UR: CLEAR
CO2 SERPL-SCNC: 29 MMOL/L (ref 21–32)
COLOR UR: ABNORMAL
CREAT SERPL-MCNC: 0.82 MG/DL (ref 0.6–1.3)
EOSINOPHIL # BLD AUTO: 0.16 THOUSAND/ÂΜL (ref 0–0.61)
EOSINOPHIL NFR BLD AUTO: 3 % (ref 0–6)
ERYTHROCYTE [DISTWIDTH] IN BLOOD BY AUTOMATED COUNT: 14.2 % (ref 11.6–15.1)
GFR SERPL CREATININE-BSD FRML MDRD: 87 ML/MIN/1.73SQ M
GLUCOSE P FAST SERPL-MCNC: 157 MG/DL (ref 65–99)
GLUCOSE UR STRIP-MCNC: NEGATIVE MG/DL
HCT VFR BLD AUTO: 42.4 % (ref 36.5–49.3)
HDLC SERPL-MCNC: 57 MG/DL
HGB BLD-MCNC: 14.1 G/DL (ref 12–17)
HGB UR QL STRIP.AUTO: NEGATIVE
IMM GRANULOCYTES # BLD AUTO: 0.01 THOUSAND/UL (ref 0–0.2)
IMM GRANULOCYTES NFR BLD AUTO: 0 % (ref 0–2)
KETONES UR STRIP-MCNC: NEGATIVE MG/DL
LDLC SERPL CALC-MCNC: 94 MG/DL (ref 0–100)
LEUKOCYTE ESTERASE UR QL STRIP: NEGATIVE
LYMPHOCYTES # BLD AUTO: 1.15 THOUSANDS/ÂΜL (ref 0.6–4.47)
LYMPHOCYTES NFR BLD AUTO: 24 % (ref 14–44)
MCH RBC QN AUTO: 31 PG (ref 26.8–34.3)
MCHC RBC AUTO-ENTMCNC: 33.3 G/DL (ref 31.4–37.4)
MCV RBC AUTO: 93 FL (ref 82–98)
MONOCYTES # BLD AUTO: 0.44 THOUSAND/ÂΜL (ref 0.17–1.22)
MONOCYTES NFR BLD AUTO: 9 % (ref 4–12)
MUCOUS THREADS UR QL AUTO: ABNORMAL
NEUTROPHILS # BLD AUTO: 3 THOUSANDS/ÂΜL (ref 1.85–7.62)
NEUTS SEG NFR BLD AUTO: 63 % (ref 43–75)
NITRITE UR QL STRIP: NEGATIVE
NON-SQ EPI CELLS URNS QL MICRO: ABNORMAL /HPF
NONHDLC SERPL-MCNC: 102 MG/DL
NRBC BLD AUTO-RTO: 0 /100 WBCS
P AXIS: 26 DEGREES
PH UR STRIP.AUTO: 6 [PH]
PLATELET # BLD AUTO: 287 THOUSANDS/UL (ref 149–390)
PMV BLD AUTO: 9 FL (ref 8.9–12.7)
POTASSIUM SERPL-SCNC: 4.5 MMOL/L (ref 3.5–5.3)
PR INTERVAL: 194 MS
PROT SERPL-MCNC: 6.4 G/DL (ref 6.4–8.4)
PROT UR STRIP-MCNC: ABNORMAL MG/DL
PSA SERPL-MCNC: 3.6 NG/ML (ref 0–4)
QRS AXIS: 1 DEGREES
QRSD INTERVAL: 98 MS
QT INTERVAL: 414 MS
QTC INTERVAL: 420 MS
RBC # BLD AUTO: 4.55 MILLION/UL (ref 3.88–5.62)
RBC #/AREA URNS AUTO: ABNORMAL /HPF
SODIUM SERPL-SCNC: 143 MMOL/L (ref 135–147)
SP GR UR STRIP.AUTO: 1.03 (ref 1–1.03)
T WAVE AXIS: -6 DEGREES
TRIGL SERPL-MCNC: 40 MG/DL
UROBILINOGEN UR STRIP-ACNC: <2 MG/DL
VENTRICULAR RATE: 62 BPM
WBC # BLD AUTO: 4.8 THOUSAND/UL (ref 4.31–10.16)
WBC #/AREA URNS AUTO: ABNORMAL /HPF

## 2024-03-12 PROCEDURE — 80061 LIPID PANEL: CPT

## 2024-03-12 PROCEDURE — 93005 ELECTROCARDIOGRAM TRACING: CPT

## 2024-03-12 PROCEDURE — 71046 X-RAY EXAM CHEST 2 VIEWS: CPT

## 2024-03-12 PROCEDURE — 93010 ELECTROCARDIOGRAM REPORT: CPT | Performed by: INTERNAL MEDICINE

## 2024-03-13 DIAGNOSIS — R73.9 HYPERGLYCEMIA: Primary | ICD-10-CM

## 2024-03-13 NOTE — RESULT ENCOUNTER NOTE
Please call Juna Pablo.     His labs are normal except for elevated blood sugar 157.  He will need a follow-up hemoglobin A1c.  This is to screen for diabetes.  I will enter the order now and Juan Pablo should stop by the lab at his convenience.  This is nonfasting.  We will notify him of results.

## 2024-03-23 LAB
ATRIAL RATE: 62 BPM
P AXIS: 26 DEGREES
PR INTERVAL: 194 MS
QRS AXIS: 1 DEGREES
QRSD INTERVAL: 98 MS
QT INTERVAL: 414 MS
QTC INTERVAL: 420 MS
T WAVE AXIS: -6 DEGREES
VENTRICULAR RATE: 62 BPM

## 2024-03-23 PROCEDURE — 93010 ELECTROCARDIOGRAM REPORT: CPT | Performed by: INTERNAL MEDICINE

## 2024-03-27 ENCOUNTER — APPOINTMENT (OUTPATIENT)
Dept: RADIOLOGY | Facility: HOSPITAL | Age: 74
End: 2024-03-27
Payer: MEDICARE

## 2024-03-27 ENCOUNTER — ANESTHESIA EVENT (OUTPATIENT)
Dept: PERIOP | Facility: HOSPITAL | Age: 74
End: 2024-03-27
Payer: MEDICARE

## 2024-03-27 ENCOUNTER — ANESTHESIA (OUTPATIENT)
Dept: PERIOP | Facility: HOSPITAL | Age: 74
End: 2024-03-27
Payer: MEDICARE

## 2024-03-27 ENCOUNTER — HOSPITAL ENCOUNTER (OUTPATIENT)
Facility: HOSPITAL | Age: 74
Setting detail: OUTPATIENT SURGERY
Discharge: HOME | End: 2024-03-27
Attending: ORTHOPAEDIC SURGERY | Admitting: ORTHOPAEDIC SURGERY
Payer: MEDICARE

## 2024-03-27 VITALS
SYSTOLIC BLOOD PRESSURE: 140 MMHG | BODY MASS INDEX: 28 KG/M2 | HEART RATE: 61 BPM | WEIGHT: 200 LBS | TEMPERATURE: 97.6 F | OXYGEN SATURATION: 98 % | HEIGHT: 71 IN | RESPIRATION RATE: 14 BRPM | DIASTOLIC BLOOD PRESSURE: 83 MMHG

## 2024-03-27 DIAGNOSIS — T84.84XA PAINFUL ORTHOPAEDIC HARDWARE (HCC): Primary | ICD-10-CM

## 2024-03-27 PROCEDURE — 20680 REMOVAL OF IMPLANT DEEP: CPT | Performed by: PHYSICIAN ASSISTANT

## 2024-03-27 PROCEDURE — 73600 X-RAY EXAM OF ANKLE: CPT

## 2024-03-27 PROCEDURE — NC001 PR NO CHARGE: Performed by: ORTHOPAEDIC SURGERY

## 2024-03-27 PROCEDURE — 20680 REMOVAL OF IMPLANT DEEP: CPT | Performed by: ORTHOPAEDIC SURGERY

## 2024-03-27 RX ORDER — ONDANSETRON 2 MG/ML
4 INJECTION INTRAMUSCULAR; INTRAVENOUS ONCE AS NEEDED
Status: DISCONTINUED | OUTPATIENT
Start: 2024-03-27 | End: 2024-03-27 | Stop reason: HOSPADM

## 2024-03-27 RX ORDER — SODIUM CHLORIDE, SODIUM LACTATE, POTASSIUM CHLORIDE, CALCIUM CHLORIDE 600; 310; 30; 20 MG/100ML; MG/100ML; MG/100ML; MG/100ML
125 INJECTION, SOLUTION INTRAVENOUS CONTINUOUS
Status: DISCONTINUED | OUTPATIENT
Start: 2024-03-27 | End: 2024-03-27

## 2024-03-27 RX ORDER — OXYCODONE HYDROCHLORIDE 5 MG/1
5 TABLET ORAL EVERY 4 HOURS PRN
Qty: 25 TABLET | Refills: 0 | Status: SHIPPED | OUTPATIENT
Start: 2024-03-27

## 2024-03-27 RX ORDER — CHLORHEXIDINE GLUCONATE ORAL RINSE 1.2 MG/ML
15 SOLUTION DENTAL ONCE
Status: DISCONTINUED | OUTPATIENT
Start: 2024-03-27 | End: 2024-03-27 | Stop reason: CLARIF

## 2024-03-27 RX ORDER — TRAMADOL HYDROCHLORIDE 50 MG/1
50 TABLET ORAL EVERY 6 HOURS SCHEDULED
Status: CANCELLED | OUTPATIENT
Start: 2024-03-27

## 2024-03-27 RX ORDER — PROPOFOL 10 MG/ML
INJECTION, EMULSION INTRAVENOUS AS NEEDED
Status: DISCONTINUED | OUTPATIENT
Start: 2024-03-27 | End: 2024-03-27

## 2024-03-27 RX ORDER — FENTANYL CITRATE 50 UG/ML
INJECTION, SOLUTION INTRAMUSCULAR; INTRAVENOUS AS NEEDED
Status: DISCONTINUED | OUTPATIENT
Start: 2024-03-27 | End: 2024-03-27

## 2024-03-27 RX ORDER — FENTANYL CITRATE/PF 50 MCG/ML
50 SYRINGE (ML) INJECTION
Status: DISCONTINUED | OUTPATIENT
Start: 2024-03-27 | End: 2024-03-27 | Stop reason: HOSPADM

## 2024-03-27 RX ORDER — DEXAMETHASONE SODIUM PHOSPHATE 10 MG/ML
INJECTION, SOLUTION INTRAMUSCULAR; INTRAVENOUS AS NEEDED
Status: DISCONTINUED | OUTPATIENT
Start: 2024-03-27 | End: 2024-03-27

## 2024-03-27 RX ORDER — ONDANSETRON 2 MG/ML
INJECTION INTRAMUSCULAR; INTRAVENOUS AS NEEDED
Status: DISCONTINUED | OUTPATIENT
Start: 2024-03-27 | End: 2024-03-27

## 2024-03-27 RX ORDER — ONDANSETRON 2 MG/ML
4 INJECTION INTRAMUSCULAR; INTRAVENOUS EVERY 6 HOURS PRN
Status: CANCELLED | OUTPATIENT
Start: 2024-03-27

## 2024-03-27 RX ORDER — ACETAMINOPHEN 325 MG/1
650 TABLET ORAL EVERY 6 HOURS PRN
Status: CANCELLED | OUTPATIENT
Start: 2024-03-27

## 2024-03-27 RX ORDER — ASPIRIN 325 MG
325 TABLET ORAL 2 TIMES DAILY
Qty: 60 TABLET | Refills: 0 | Status: SHIPPED | OUTPATIENT
Start: 2024-03-27

## 2024-03-27 RX ORDER — BUPIVACAINE HYDROCHLORIDE 5 MG/ML
INJECTION, SOLUTION EPIDURAL; INTRACAUDAL AS NEEDED
Status: DISCONTINUED | OUTPATIENT
Start: 2024-03-27 | End: 2024-03-27 | Stop reason: HOSPADM

## 2024-03-27 RX ORDER — HYDROMORPHONE HCL IN WATER/PF 6 MG/30 ML
0.2 PATIENT CONTROLLED ANALGESIA SYRINGE INTRAVENOUS
Status: DISCONTINUED | OUTPATIENT
Start: 2024-03-27 | End: 2024-03-27 | Stop reason: HOSPADM

## 2024-03-27 RX ORDER — CEFAZOLIN SODIUM 2 G/50ML
2000 SOLUTION INTRAVENOUS ONCE
Status: COMPLETED | OUTPATIENT
Start: 2024-03-27 | End: 2024-03-27

## 2024-03-27 RX ORDER — METOCLOPRAMIDE HYDROCHLORIDE 5 MG/ML
10 INJECTION INTRAMUSCULAR; INTRAVENOUS ONCE AS NEEDED
Status: DISCONTINUED | OUTPATIENT
Start: 2024-03-27 | End: 2024-03-27 | Stop reason: HOSPADM

## 2024-03-27 RX ORDER — ALBUTEROL SULFATE 2.5 MG/3ML
2.5 SOLUTION RESPIRATORY (INHALATION) ONCE AS NEEDED
Status: DISCONTINUED | OUTPATIENT
Start: 2024-03-27 | End: 2024-03-27 | Stop reason: HOSPADM

## 2024-03-27 RX ORDER — HYDROMORPHONE HCL/PF 1 MG/ML
SYRINGE (ML) INJECTION AS NEEDED
Status: DISCONTINUED | OUTPATIENT
Start: 2024-03-27 | End: 2024-03-27

## 2024-03-27 RX ADMIN — SODIUM CHLORIDE, SODIUM LACTATE, POTASSIUM CHLORIDE, AND CALCIUM CHLORIDE 125 ML/HR: .6; .31; .03; .02 INJECTION, SOLUTION INTRAVENOUS at 10:55

## 2024-03-27 RX ADMIN — FENTANYL CITRATE 50 MCG: 50 INJECTION INTRAMUSCULAR; INTRAVENOUS at 12:34

## 2024-03-27 RX ADMIN — DEXAMETHASONE SODIUM PHOSPHATE 10 MG: 10 INJECTION, SOLUTION INTRAMUSCULAR; INTRAVENOUS at 12:40

## 2024-03-27 RX ADMIN — CEFAZOLIN SODIUM 2000 MG: 2 SOLUTION INTRAVENOUS at 12:40

## 2024-03-27 RX ADMIN — ONDANSETRON 4 MG: 2 INJECTION INTRAMUSCULAR; INTRAVENOUS at 12:40

## 2024-03-27 RX ADMIN — PROPOFOL 200 MG: 10 INJECTION, EMULSION INTRAVENOUS at 12:34

## 2024-03-27 RX ADMIN — HYDROMORPHONE HYDROCHLORIDE 0.5 MG: 1 INJECTION, SOLUTION INTRAMUSCULAR; INTRAVENOUS; SUBCUTANEOUS at 13:21

## 2024-03-27 RX ADMIN — FENTANYL CITRATE 50 MCG: 50 INJECTION INTRAMUSCULAR; INTRAVENOUS at 13:01
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
## 2024-03-27 NOTE — ANESTHESIA PREPROCEDURE EVALUATION
Procedure:  REMOVAL HARDWARE ANKLE (Right: Ankle)    Relevant Problems   GI/HEPATIC   (+) Gastroesophageal reflux disease        Physical Exam    Airway    Mallampati score: II  TM Distance: >3 FB  Neck ROM: full     Dental       Cardiovascular      Pulmonary      Other Findings        Anesthesia Plan  ASA Score- 2     Anesthesia Type- general with ASA Monitors.         Additional Monitors:     Airway Plan: LMA.           Plan Factors-Exercise tolerance (METS): >4 METS.    Chart reviewed.        Patient is not a current smoker.      Obstructive sleep apnea risk education given perioperatively.        Induction- intravenous.    Postoperative Plan- Plan for postoperative opioid use. Planned trial extubation    Informed Consent- Anesthetic plan and risks discussed with patient.  I personally reviewed this patient with the CRNA. Discussed and agreed on the Anesthesia Plan with the CRNA..            Anesthesia plan and consent discussed with Juan Pablo who expressed understanding and agreement. Risks/benefits and alternatives discussed with patient including possible PONV, sore throat, damage to teeth/lips/gums and possibility of rare anesthetic and surgical emergencies.

## 2024-03-27 NOTE — ANESTHESIA POSTPROCEDURE EVALUATION
Post-Op Assessment Note    CV Status:  Stable    Pain management: adequate       Mental Status:  Alert and awake   Hydration Status:  Euvolemic   PONV Controlled:  Controlled   Airway Patency:  Patent  Two or more mitigation strategies used for obstructive sleep apnea   Post Op Vitals Reviewed: Yes    No anethesia notable event occurred.    Staff: CRNA               BP   146/89   Temp   97.6   Pulse  73   Resp   16   SpO2   97% RA

## 2024-03-27 NOTE — DISCHARGE INSTR - AVS FIRST PAGE
Discharge Instructions - Orthopedics  Juan Pablo Timmons 74 y.o. male MRN: [id number]  Unit/Bed#: [facility] 14    Weight Bearing Status:                                           Weight bearing as tolerated right lower extremity     DVT prophylaxis:  Aspirin 81mg twice daily by mouth  x  28 days in duration     Pain:  Continue analgesics as directed    Dressing Instructions:   Remove dressings in 3 days. Okay To begin showering at that time.  Allow water to flow over the incision sites.  Do not vigorously scrub or soak wounds until otherwise stated     Appt Instructions:   If you do not have your appointment, please call the clinic at [phone]  Otherwise follow up as scheduled.    Contact the office sooner if you experience any increased numbness/tingling in the extremities.

## 2024-03-28 NOTE — OP NOTE
OPERATIVE REPORT  PATIENT NAME: Juan Pablo Timmons    :  1950  MRN: [id number]  Pt Location: [facility] 04    SURGERY DATE: 3/27/2024    Surgeons and Role:     * Larry Berman MD - Primary     * John Warner PA-C    Preop Diagnosis:  Painful orthopaedic hardware (HCC) [T84.84XA]    Post-Op Diagnosis Codes:     * Painful orthopaedic hardware (HCC) [T84.84XA]    Procedure(s):  Right - REMOVAL HARDWARE R tibia plate/screws (CPT 62598)    Specimen(s):  * No specimens in log *    Estimated Blood Loss:   25 mL    Drains:  * No LDAs found *    Anesthesia Type:   General    Operative Indications:  Painful R tibia hardware in ambulatory patient     Operative Findings:  See below    Complications:   None    Procedure and Technique:  The patient operative site, laterality, seizure, consent were verified in the preoperative area and the patient was taken to the OR.  General anesthesia was induced and the operative extremity was prepped and draped in the usual sterile fashion.  After timeout, old medial tibial proximal incision was incised.  Distally, plate was found to rest posterior to the original incision and a more posteriorly based incision was made.  Scar tissue dissection took place taking care to protect saphenous structures.  Tibial hardware was easily identified and medial plate and screws were removed in their entirety with the exception of 2 proximal threads where screw head had broken off of screw.  These threads were completely buried in bone and not palpable and decision was made to not trephine bone to remove the threads.  Utilizing elevator to elevate plate off of the bone the plate was then removed.  Anteriorly, old stab incision was opened and lag screw was removed without difficulty.  Fluoroscopic imaging revealed removal of mentioned tibial hardware and stable alignment of tibia.  Wounds were copiously irrigated with sterile saline.  Closure to place with Vicryl sutures and nylon sutures.   Sterile dressings were applied.  The leg was wrapped with Webril and Ace wrap.  All final counts were correct.  I was present for the entire procedure.  The patient was extubated and awakened and taken to the PACU in stable condition.  There were no immediate complications.    There was no qualified resident available for the procedure. Critical assistance from Herman Warner PA-C was required for all components of the procedure including but not limited to positioning, soft tissue retraction, passage of instrumentation,  soft tissue closure.     The patient will be weightbearing as tolerated on the operative extremity.  He may shower in 72 hours.  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  Of note, patient was previously noncompliant with DVT prophylaxis recommendations.  We will consider suture removal in 2 weeks.    Patient Disposition:  PACU         SIGNATURE: Larry Berman MD  DATE: March 28, 2024  TIME: 7:42 PM

## 2024-04-10 VITALS
BODY MASS INDEX: 28 KG/M2 | SYSTOLIC BLOOD PRESSURE: 126 MMHG | HEIGHT: 71 IN | WEIGHT: 200 LBS | DIASTOLIC BLOOD PRESSURE: 71 MMHG | HEART RATE: 76 BPM

## 2024-04-10 DIAGNOSIS — T84.84XA PAINFUL ORTHOPAEDIC HARDWARE (HCC): Primary | ICD-10-CM

## 2024-04-10 PROCEDURE — 99024 POSTOP FOLLOW-UP VISIT: CPT | Performed by: ORTHOPAEDIC SURGERY

## 2024-04-10 NOTE — PATIENT INSTRUCTIONS
Weight bearing as tolerated right lower extremity   Sutures removed in the office.  Patient tolerated well.   Okay To begin showering.  Allow water to flow over the incision sites.  Do not vigorously scrub or soak wounds until otherwise stated   Maintain steri-strips for 3-5 days in duration. If steri-strips remain intact for 7 days, peel off carefully   Over the counter analgesics as needed / directed   Ice / heat as directed   Follow up PRN

## 2024-04-10 NOTE — PROGRESS NOTES
Orthopaedics Office Visit - Post-op Patient Visit    ASSESSMENT/PLAN:    Assessment:   2 weeks s/p removal of hardware right ankle  /  tibia  DOS 3/27/24  Currently minimally symptomatic , ankle motion improved from preop status      Plan:   Weight bearing as tolerated right lower extremity   Sutures removed in the office.  Patient tolerated well.   Okay To begin showering.  Allow water to flow over the incision sites.  Do not vigorously scrub or soak wounds until otherwise stated   Maintain steri-strips for 3-5 days in duration. If steri-strips remain intact for 7 days, peel off carefully   Over the counter analgesics as needed / directed   Ice / heat as directed   Follow up PRN         To Do Next Visit:  PRN   _____________________________________________________  CHIEF COMPLAINT:  Chief Complaint   Patient presents with    Right Foot - Post-op         SUBJECTIVE:  Juan Pablo Timmons is a 74 y.o. male who presents  2  weeks s/p removal of hardware right ankle  /  tibia  DOS 3/27/24.  Patient states that his leg is doing well overall.  Patient states that he has minimal pain in the ankle currently.  Patient states that he has noted increased movement in the ankle status post surgery.  Patient has not been taking any type of pain medication.  Patient has been compliant with anticoagulation.  Patient denies any numbness or tingling in the leg.  Patient offers no other complaints at this time.      SOCIAL HISTORY:  Social History     Tobacco Use    Smoking status: Never    Smokeless tobacco: Never   Vaping Use    Vaping status: Never Used   Substance Use Topics    Alcohol use: Yes     Comment: social    Drug use: Never       MEDICATIONS:    Current Outpatient Medications:     aspirin 325 mg tablet, Take 1 tablet (325 mg total) by mouth 2 (two) times a day, Disp: 60 tablet, Rfl: 0    omeprazole (PriLOSEC) 20 mg delayed release capsule, Take 20 mg by mouth daily , Disp: , Rfl:     oxyCODONE (Roxicodone) 5 immediate release  "tablet, Take 1 tablet (5 mg total) by mouth every 4 (four) hours as needed for moderate pain Max Daily Amount: 30 mg, Disp: 25 tablet, Rfl: 0    REVIEW OF SYSTEMS:  MSK: right ankle pain   Neuro: WNL   Pertinent items are otherwise noted in HPI.  A comprehensive review of systems was otherwise negative.    _____________________________________________________  PHYSICAL EXAMINATION:  Vital signs: Ht 5' 11\" (1.803 m)   Wt 90.7 kg (200 lb)   BMI 27.89 kg/m²   General: No acute distress, awake and alert  Psychiatric: Mood and affect appear appropriate  HEENT: Trachea Midline, No torticollis, no apparent facial trauma  Cardiovascular: No audible murmurs; Extremities appear perfused  Pulmonary: No audible wheezing or stridor  Skin: No open lesions; see further details (if any) below      MUSCULOSKELETAL EXAMINATION:  Right ankle examination:  Patient sitting comfortably in the office in no apparent distress   Healed incision site noted over the medial aspect of the lower leg with no surrounding erythema or ecchymosis present.  Sutures intact  Mild tenderness palpation noted over incision sites.  No other bony or soft tissue tenderness to palpation noted at this time.  Full range of motion of the ankle appreciated with no pain  NV intact    _____________________________________________________  STUDIES REVIEWED:  I personally reviewed the images and interpretation is as follows:  N/A         PROCEDURES PERFORMED:  Suture removal    Date/Time: 4/10/2024 8:30 AM    Performed by: Larry Berman MD  Authorized by: Larry Berman MD  Universal Protocol:  Consent: Verbal consent obtained.  Risks and benefits: risks, benefits and alternatives were discussed  Consent given by: patient  Patient understanding: patient states understanding of the procedure being performed  Site marked: the operative site was marked  Patient identity confirmed: verbally with patient      Patient location:  Bedside  Location:     Laterality:  " "Right    Location:  Lower extremity    Lower extremity location:  Leg    Leg location:  R lower leg  Procedure details:     Tools used:  Suture removal kit    Wound appearance:  No sign(s) of infection, good wound healing and clean  Post-procedure details:     Post-removal:  Steri-Strips applied    Patient tolerance of procedure:  Tolerated well, no immediate complications                John Warner PA-C - assisting  Larry Berman MD                        Portions of the record may have been created with voice recognition software.  Occasional wrong word or \"sound a like\" substitutions may have occurred due to the inherent limitations of voice recognition software.  Read the chart carefully and recognize, using context, where substitutions have occurred.    "

## 2024-07-10 ENCOUNTER — RA CDI HCC (OUTPATIENT)
Dept: OTHER | Facility: HOSPITAL | Age: 74
End: 2024-07-10

## (undated) DEVICE — SUT VICRYL 2-0 CTB-1 36 IN JB945

## (undated) DEVICE — 3.5MM CORTEX SCREW SELF-TAPPING 50MM: Type: IMPLANTABLE DEVICE | Status: NON-FUNCTIONAL

## (undated) DEVICE — ABDOMINAL PAD: Brand: DERMACEA

## (undated) DEVICE — GLOVE INDICATOR PI UNDERGLOVE SZ 8 BLUE

## (undated) DEVICE — ELECTRODE BLADE MOD E-Z CLEAN 2.5IN 6.4CM -0012M

## (undated) DEVICE — SUT VICRYL PLUS 2-0 CTB-1 27 IN VCPB259H

## (undated) DEVICE — DRAPE SHEET THREE QUARTER

## (undated) DEVICE — PADDING CAST 4 IN  COTTON STRL

## (undated) DEVICE — CURITY NON-ADHERENT STRIPS: Brand: CURITY

## (undated) DEVICE — Device

## (undated) DEVICE — SPONGE SCRUB 4 PCT CHLORHEXIDINE

## (undated) DEVICE — ACE WRAP 6 IN UNSTERILE

## (undated) DEVICE — INTENDED FOR TISSUE SEPARATION, AND OTHER PROCEDURES THAT REQUIRE A SHARP SURGICAL BLADE TO PUNCTURE OR CUT.: Brand: BARD-PARKER SAFETY BLADES SIZE 15, STERILE

## (undated) DEVICE — BANDAGE, ESMARK LF STR 6"X9' (20/CS): Brand: CYPRESS

## (undated) DEVICE — INTENDED FOR TISSUE SEPARATION, AND OTHER PROCEDURES THAT REQUIRE A SHARP SURGICAL BLADE TO PUNCTURE OR CUT.: Brand: BARD-PARKER SAFETY BLADES SIZE 10, STERILE

## (undated) DEVICE — CHLORAPREP HI-LITE 26ML ORANGE

## (undated) DEVICE — 5.0MM CANNULATED DRILL BIT LARGE QC/300MM

## (undated) DEVICE — DRAPE C-ARM X-RAY

## (undated) DEVICE — CUFF TOURNIQUET 30 X 4 IN QUICK CONNECT DISP 1BLA

## (undated) DEVICE — 2.8MM PERCUTANEOUS DRILL BIT F/LCP® PL QC/200MM/100MM CALIB: Brand: LCP

## (undated) DEVICE — 3M™ STERI-STRIP™ REINFORCED ADHESIVE SKIN CLOSURES, R1541, 1/4 IN X 3 IN (6 MM X 75 MM), 3 STRIPS/ENVELOPE: Brand: 3M™ STERI-STRIP™

## (undated) DEVICE — BULB SYRINGE,IRRIGATION WITH PROTECTIVE CAP: Brand: DOVER

## (undated) DEVICE — GAUZE SPONGES,16 PLY: Brand: CURITY

## (undated) DEVICE — COBAN 4 IN STERILE

## (undated) DEVICE — DRAPE C-ARMOUR

## (undated) DEVICE — PLUMEPEN PRO 10FT

## (undated) DEVICE — 2.0MM DRILL BIT WITH DEPTH MARK/QC/140MM

## (undated) DEVICE — 3M™ STERI-STRIP™ REINFORCED ADHESIVE SKIN CLOSURES, R1546, 1/4 IN X 4 IN (6 MM X 100 MM), 10 STRIPS/ENVELOPE: Brand: 3M™ STERI-STRIP™

## (undated) DEVICE — GLOVE SRG BIOGEL ORTHOPEDIC 8

## (undated) DEVICE — PAD GROUNDING DUAL ADULT

## (undated) DEVICE — UNIVERSAL MAJOR EXTREMITY,KIT: Brand: CARDINAL HEALTH

## (undated) DEVICE — 2.5MM DRILL BIT/QC/GOLD/180MM

## (undated) DEVICE — SUT ETHILON 3-0 FS-1 18 IN 663G

## (undated) DEVICE — SUT VICRYL PLUS 0 CTB-1 27 IN VCPB260H

## (undated) DEVICE — DRAPE EQUIPMENT RF WAND

## (undated) DEVICE — GLOVE SRG BIOGEL 8.5

## (undated) DEVICE — 3.5MM DRILL BIT/QC/110MM

## (undated) DEVICE — GLOVE INDICATOR PI UNDERGLOVE SZ 8.5 BLUE

## (undated) DEVICE — SPONGE PVP SCRUB WING STERILE

## (undated) DEVICE — DRESSING XEROFORM 5 X 9

## (undated) DEVICE — BETHLEHEM UNIV MAJ EXT ,KIT: Brand: CARDINAL HEALTH

## (undated) DEVICE — 2.5MM DRILL BIT/QC/GOLD/110MM

## (undated) DEVICE — PAD CAST 6 IN COTTON NON STERILE

## (undated) DEVICE — ACE WRAP 4 IN UNSTERILE

## (undated) DEVICE — SPECIMEN CONTAINER STERILE PEEL PACK

## (undated) DEVICE — PENCIL ELECTROSURG E-Z CLEAN -0035H

## (undated) DEVICE — 1.25MM NON-THREADED GUIDE WIRE 150MM